# Patient Record
Sex: FEMALE | Race: WHITE | Employment: FULL TIME | URBAN - METROPOLITAN AREA
[De-identification: names, ages, dates, MRNs, and addresses within clinical notes are randomized per-mention and may not be internally consistent; named-entity substitution may affect disease eponyms.]

---

## 2020-06-10 ENCOUNTER — HOSPITAL ENCOUNTER (OUTPATIENT)
Age: 50
Setting detail: SPECIMEN
Discharge: HOME | End: 2020-06-10
Payer: COMMERCIAL

## 2020-06-10 LAB — POTASSIUM SERPL-SCNC: 4.1 MMOL/L (ref 3.7–5.3)

## 2020-10-01 ENCOUNTER — HOSPITAL ENCOUNTER (OUTPATIENT)
Age: 50
Setting detail: SPECIMEN
Discharge: HOME | End: 2020-10-01
Payer: COMMERCIAL

## 2020-10-01 LAB
ALBUMIN SERPL-MCNC: 4.5 G/DL (ref 3.5–5.2)
ALBUMIN/GLOBULIN RATIO: 2 (ref 1–2.5)
ALP BLD-CCNC: 109 U/L (ref 35–104)
ALT SERPL-CCNC: 28 U/L (ref 5–33)
AMYLASE: 58 U/L (ref 28–100)
ANION GAP SERPL CALCULATED.3IONS-SCNC: 10 MMOL/L (ref 9–17)
AST SERPL-CCNC: 21 U/L
BILIRUB SERPL-MCNC: 0.41 MG/DL (ref 0.3–1.2)
BUN BLDV-MCNC: 22 MG/DL (ref 6–20)
BUN/CREAT BLD: ABNORMAL (ref 9–20)
CALCIUM SERPL-MCNC: 9.6 MG/DL (ref 8.6–10.4)
CHLORIDE BLD-SCNC: 103 MMOL/L (ref 98–107)
CO2: 28 MMOL/L (ref 20–31)
CREAT SERPL-MCNC: 0.89 MG/DL (ref 0.5–0.9)
GFR AFRICAN AMERICAN: >60 ML/MIN
GFR NON-AFRICAN AMERICAN: >60 ML/MIN
GFR SERPL CREATININE-BSD FRML MDRD: ABNORMAL ML/MIN/{1.73_M2}
GFR SERPL CREATININE-BSD FRML MDRD: ABNORMAL ML/MIN/{1.73_M2}
GLUCOSE BLD-MCNC: 106 MG/DL (ref 70–99)
HCT VFR BLD CALC: 41.6 % (ref 36.3–47.1)
HEMOGLOBIN: 13.9 G/DL (ref 11.9–15.1)
LIPASE: 24 U/L (ref 13–60)
MCH RBC QN AUTO: 30.8 PG (ref 25.2–33.5)
MCHC RBC AUTO-ENTMCNC: 33.4 G/DL (ref 28.4–34.8)
MCV RBC AUTO: 92 FL (ref 82.6–102.9)
NRBC AUTOMATED: 0 PER 100 WBC
PDW BLD-RTO: 12.3 % (ref 11.8–14.4)
PLATELET # BLD: 267 K/UL (ref 138–453)
PMV BLD AUTO: 10.5 FL (ref 8.1–13.5)
POTASSIUM SERPL-SCNC: 4 MMOL/L (ref 3.7–5.3)
RBC # BLD: 4.52 M/UL (ref 3.95–5.11)
SODIUM BLD-SCNC: 141 MMOL/L (ref 135–144)
TOTAL PROTEIN: 6.7 G/DL (ref 6.4–8.3)
WBC # BLD: 7.4 K/UL (ref 3.5–11.3)

## 2021-07-20 ENCOUNTER — HOSPITAL ENCOUNTER (OUTPATIENT)
Dept: GENERAL RADIOLOGY | Age: 51
Discharge: HOME | End: 2021-07-22
Payer: COMMERCIAL

## 2021-07-20 ENCOUNTER — HOSPITAL ENCOUNTER (OUTPATIENT)
Age: 51
Discharge: HOME | End: 2021-07-22
Payer: COMMERCIAL

## 2021-07-20 DIAGNOSIS — S93.491A HIGH ANKLE SPRAIN OF RIGHT LOWER EXTREMITY, INITIAL ENCOUNTER: ICD-10-CM

## 2021-07-20 PROCEDURE — 73610 X-RAY EXAM OF ANKLE: CPT

## 2023-01-12 ENCOUNTER — APPOINTMENT (OUTPATIENT)
Dept: CT IMAGING | Age: 53
End: 2023-01-12
Payer: COMMERCIAL

## 2023-01-12 ENCOUNTER — APPOINTMENT (OUTPATIENT)
Dept: GENERAL RADIOLOGY | Age: 53
End: 2023-01-12
Payer: COMMERCIAL

## 2023-01-12 ENCOUNTER — HOSPITAL ENCOUNTER (EMERGENCY)
Age: 53
Discharge: HOME | End: 2023-01-12
Attending: EMERGENCY MEDICINE
Payer: COMMERCIAL

## 2023-01-12 VITALS
TEMPERATURE: 98.2 F | BODY MASS INDEX: 28.56 KG/M2 | OXYGEN SATURATION: 100 % | RESPIRATION RATE: 16 BRPM | WEIGHT: 204 LBS | SYSTOLIC BLOOD PRESSURE: 102 MMHG | HEIGHT: 71 IN | HEART RATE: 60 BPM | DIASTOLIC BLOOD PRESSURE: 63 MMHG

## 2023-01-12 DIAGNOSIS — S52.021A CLOSED FRACTURE OF OLECRANON PROCESS OF RIGHT ULNA, INITIAL ENCOUNTER: Primary | ICD-10-CM

## 2023-01-12 DIAGNOSIS — S93.402A SPRAIN OF LEFT ANKLE, UNSPECIFIED LIGAMENT, INITIAL ENCOUNTER: ICD-10-CM

## 2023-01-12 LAB
ABSOLUTE EOS #: 0 K/UL (ref 0–0.4)
ABSOLUTE LYMPH #: 1.4 K/UL (ref 1–4.8)
ABSOLUTE MONO #: 0.5 K/UL (ref 0.1–1.3)
ALBUMIN SERPL-MCNC: 4.1 G/DL (ref 3.5–5.2)
ALP BLD-CCNC: 114 U/L (ref 35–104)
ALT SERPL-CCNC: 27 U/L (ref 5–33)
ANION GAP SERPL CALCULATED.3IONS-SCNC: 13 MMOL/L (ref 9–17)
AST SERPL-CCNC: 21 U/L
BASOPHILS # BLD: 0 % (ref 0–2)
BASOPHILS ABSOLUTE: 0 K/UL (ref 0–0.2)
BILIRUB SERPL-MCNC: 0.5 MG/DL (ref 0.3–1.2)
BUN BLDV-MCNC: 21 MG/DL (ref 6–20)
CALCIUM SERPL-MCNC: 9.7 MG/DL (ref 8.6–10.4)
CHLORIDE BLD-SCNC: 102 MMOL/L (ref 98–107)
CO2: 26 MMOL/L (ref 20–31)
CREAT SERPL-MCNC: 1.01 MG/DL (ref 0.5–0.9)
EOSINOPHILS RELATIVE PERCENT: 1 % (ref 0–4)
GFR SERPL CREATININE-BSD FRML MDRD: >60 ML/MIN/1.73M2
GLUCOSE BLD-MCNC: 123 MG/DL (ref 70–99)
HCG(URINE) PREGNANCY TEST: NEGATIVE
HCT VFR BLD CALC: 40 % (ref 36–46)
HEMOGLOBIN: 13.8 G/DL (ref 12–16)
LYMPHOCYTES # BLD: 15 % (ref 24–44)
MAGNESIUM: 2.1 MG/DL (ref 1.6–2.6)
MCH RBC QN AUTO: 30.9 PG (ref 26–34)
MCHC RBC AUTO-ENTMCNC: 34.4 G/DL (ref 31–37)
MCV RBC AUTO: 90 FL (ref 80–100)
MONOCYTES # BLD: 6 % (ref 1–7)
PDW BLD-RTO: 13.1 % (ref 11.5–14.9)
PLATELET # BLD: 248 K/UL (ref 150–450)
PMV BLD AUTO: 7.7 FL (ref 6–12)
POTASSIUM SERPL-SCNC: 4 MMOL/L (ref 3.7–5.3)
RBC # BLD: 4.45 M/UL (ref 4–5.2)
SEG NEUTROPHILS: 78 % (ref 36–66)
SEGMENTED NEUTROPHILS ABSOLUTE COUNT: 7.2 K/UL (ref 1.3–9.1)
SODIUM BLD-SCNC: 141 MMOL/L (ref 135–144)
TOTAL PROTEIN: 6.7 G/DL (ref 6.4–8.3)
WBC # BLD: 9.1 K/UL (ref 3.5–11)

## 2023-01-12 PROCEDURE — 73080 X-RAY EXAM OF ELBOW: CPT

## 2023-01-12 PROCEDURE — 80053 COMPREHEN METABOLIC PANEL: CPT

## 2023-01-12 PROCEDURE — 83735 ASSAY OF MAGNESIUM: CPT

## 2023-01-12 PROCEDURE — 81025 URINE PREGNANCY TEST: CPT

## 2023-01-12 PROCEDURE — 93005 ELECTROCARDIOGRAM TRACING: CPT | Performed by: EMERGENCY MEDICINE

## 2023-01-12 PROCEDURE — 36415 COLL VENOUS BLD VENIPUNCTURE: CPT

## 2023-01-12 PROCEDURE — 2580000003 HC RX 258: Performed by: EMERGENCY MEDICINE

## 2023-01-12 PROCEDURE — 73610 X-RAY EXAM OF ANKLE: CPT

## 2023-01-12 PROCEDURE — 85025 COMPLETE CBC W/AUTO DIFF WBC: CPT

## 2023-01-12 PROCEDURE — 73200 CT UPPER EXTREMITY W/O DYE: CPT

## 2023-01-12 PROCEDURE — 99285 EMERGENCY DEPT VISIT HI MDM: CPT

## 2023-01-12 RX ORDER — NAPROXEN 500 MG/1
500 TABLET ORAL 2 TIMES DAILY
Qty: 20 TABLET | Refills: 0 | Status: SHIPPED | OUTPATIENT
Start: 2023-01-12

## 2023-01-12 RX ORDER — HYDROCODONE BITARTRATE AND ACETAMINOPHEN 5; 325 MG/1; MG/1
1 TABLET ORAL EVERY 6 HOURS PRN
Qty: 12 TABLET | Refills: 0 | Status: ON HOLD | OUTPATIENT
Start: 2023-01-12 | End: 2023-01-13 | Stop reason: HOSPADM

## 2023-01-12 RX ORDER — 0.9 % SODIUM CHLORIDE 0.9 %
1000 INTRAVENOUS SOLUTION INTRAVENOUS ONCE
Status: COMPLETED | OUTPATIENT
Start: 2023-01-12 | End: 2023-01-12

## 2023-01-12 RX ORDER — ACETAMINOPHEN 500 MG
1000 TABLET ORAL EVERY 6 HOURS PRN
Qty: 60 TABLET | Refills: 0 | Status: SHIPPED | OUTPATIENT
Start: 2023-01-12

## 2023-01-12 RX ADMIN — SODIUM CHLORIDE 1000 ML: 9 INJECTION, SOLUTION INTRAVENOUS at 09:00

## 2023-01-12 RX ADMIN — SODIUM CHLORIDE 1000 ML: 9 INJECTION, SOLUTION INTRAVENOUS at 10:16

## 2023-01-12 ASSESSMENT — PAIN DESCRIPTION - LOCATION: LOCATION: ELBOW

## 2023-01-12 ASSESSMENT — PAIN - FUNCTIONAL ASSESSMENT
PAIN_FUNCTIONAL_ASSESSMENT: 0-10
PAIN_FUNCTIONAL_ASSESSMENT: 0-10

## 2023-01-12 ASSESSMENT — PAIN SCALES - GENERAL: PAINLEVEL_OUTOF10: 4

## 2023-01-12 ASSESSMENT — PAIN DESCRIPTION - PAIN TYPE
TYPE: ACUTE PAIN
TYPE: ACUTE PAIN

## 2023-01-12 ASSESSMENT — PAIN DESCRIPTION - ORIENTATION: ORIENTATION: RIGHT

## 2023-01-12 ASSESSMENT — PAIN DESCRIPTION - FREQUENCY: FREQUENCY: CONTINUOUS

## 2023-01-12 ASSESSMENT — PAIN DESCRIPTION - DESCRIPTORS: DESCRIPTORS: SHARP

## 2023-01-12 NOTE — ED NOTES
E-mailed result note. Left message to call back or check e-mail for information.    Unable to wear sling due to posterior splint position per ortho instructions     Pearl Ledesma RN  01/12/23 0021

## 2023-01-13 ENCOUNTER — HOSPITAL ENCOUNTER (OUTPATIENT)
Age: 53
Setting detail: OUTPATIENT SURGERY
Discharge: HOME | End: 2023-01-13
Attending: STUDENT IN AN ORGANIZED HEALTH CARE EDUCATION/TRAINING PROGRAM | Admitting: STUDENT IN AN ORGANIZED HEALTH CARE EDUCATION/TRAINING PROGRAM
Payer: COMMERCIAL

## 2023-01-13 ENCOUNTER — APPOINTMENT (OUTPATIENT)
Dept: GENERAL RADIOLOGY | Age: 53
End: 2023-01-13
Attending: STUDENT IN AN ORGANIZED HEALTH CARE EDUCATION/TRAINING PROGRAM
Payer: COMMERCIAL

## 2023-01-13 ENCOUNTER — ANESTHESIA (OUTPATIENT)
Dept: OPERATING ROOM | Age: 53
End: 2023-01-13
Payer: COMMERCIAL

## 2023-01-13 ENCOUNTER — ANESTHESIA EVENT (OUTPATIENT)
Dept: OPERATING ROOM | Age: 53
End: 2023-01-13
Payer: COMMERCIAL

## 2023-01-13 VITALS
RESPIRATION RATE: 18 BRPM | TEMPERATURE: 97.2 F | SYSTOLIC BLOOD PRESSURE: 119 MMHG | OXYGEN SATURATION: 95 % | DIASTOLIC BLOOD PRESSURE: 69 MMHG | HEART RATE: 68 BPM

## 2023-01-13 DIAGNOSIS — G89.18 POST-OP PAIN: Primary | ICD-10-CM

## 2023-01-13 PROBLEM — S52.021A CLOSED FRACTURE OF RIGHT OLECRANON PROCESS: Status: ACTIVE | Noted: 2023-01-13

## 2023-01-13 LAB
EKG ATRIAL RATE: 50 BPM
EKG P AXIS: 48 DEGREES
EKG P-R INTERVAL: 164 MS
EKG Q-T INTERVAL: 484 MS
EKG QRS DURATION: 74 MS
EKG QTC CALCULATION (BAZETT): 441 MS
EKG R AXIS: 78 DEGREES
EKG T AXIS: 49 DEGREES
EKG VENTRICULAR RATE: 50 BPM

## 2023-01-13 PROCEDURE — 2720000010 HC SURG SUPPLY STERILE: Performed by: STUDENT IN AN ORGANIZED HEALTH CARE EDUCATION/TRAINING PROGRAM

## 2023-01-13 PROCEDURE — 7100000001 HC PACU RECOVERY - ADDTL 15 MIN: Performed by: STUDENT IN AN ORGANIZED HEALTH CARE EDUCATION/TRAINING PROGRAM

## 2023-01-13 PROCEDURE — 99222 1ST HOSP IP/OBS MODERATE 55: CPT | Performed by: STUDENT IN AN ORGANIZED HEALTH CARE EDUCATION/TRAINING PROGRAM

## 2023-01-13 PROCEDURE — 2580000003 HC RX 258

## 2023-01-13 PROCEDURE — 6360000002 HC RX W HCPCS: Performed by: ANESTHESIOLOGY

## 2023-01-13 PROCEDURE — 7100000011 HC PHASE II RECOVERY - ADDTL 15 MIN: Performed by: STUDENT IN AN ORGANIZED HEALTH CARE EDUCATION/TRAINING PROGRAM

## 2023-01-13 PROCEDURE — 7100000000 HC PACU RECOVERY - FIRST 15 MIN: Performed by: STUDENT IN AN ORGANIZED HEALTH CARE EDUCATION/TRAINING PROGRAM

## 2023-01-13 PROCEDURE — 93010 ELECTROCARDIOGRAM REPORT: CPT | Performed by: INTERNAL MEDICINE

## 2023-01-13 PROCEDURE — 2500000003 HC RX 250 WO HCPCS: Performed by: ANESTHESIOLOGY

## 2023-01-13 PROCEDURE — 3600000004 HC SURGERY LEVEL 4 BASE: Performed by: STUDENT IN AN ORGANIZED HEALTH CARE EDUCATION/TRAINING PROGRAM

## 2023-01-13 PROCEDURE — 6360000002 HC RX W HCPCS: Performed by: STUDENT IN AN ORGANIZED HEALTH CARE EDUCATION/TRAINING PROGRAM

## 2023-01-13 PROCEDURE — 2580000003 HC RX 258: Performed by: STUDENT IN AN ORGANIZED HEALTH CARE EDUCATION/TRAINING PROGRAM

## 2023-01-13 PROCEDURE — 6370000000 HC RX 637 (ALT 250 FOR IP): Performed by: STUDENT IN AN ORGANIZED HEALTH CARE EDUCATION/TRAINING PROGRAM

## 2023-01-13 PROCEDURE — 64415 NJX AA&/STRD BRCH PLXS IMG: CPT | Performed by: ANESTHESIOLOGY

## 2023-01-13 PROCEDURE — 2580000003 HC RX 258: Performed by: ANESTHESIOLOGY

## 2023-01-13 PROCEDURE — 2500000003 HC RX 250 WO HCPCS

## 2023-01-13 PROCEDURE — 73080 X-RAY EXAM OF ELBOW: CPT

## 2023-01-13 PROCEDURE — 3700000001 HC ADD 15 MINUTES (ANESTHESIA): Performed by: STUDENT IN AN ORGANIZED HEALTH CARE EDUCATION/TRAINING PROGRAM

## 2023-01-13 PROCEDURE — 6360000002 HC RX W HCPCS

## 2023-01-13 PROCEDURE — 24685 OPTX ULNAR FX PROX END W/FIX: CPT | Performed by: STUDENT IN AN ORGANIZED HEALTH CARE EDUCATION/TRAINING PROGRAM

## 2023-01-13 PROCEDURE — 3209999900 FLUORO FOR SURGICAL PROCEDURES

## 2023-01-13 PROCEDURE — 2709999900 HC NON-CHARGEABLE SUPPLY: Performed by: STUDENT IN AN ORGANIZED HEALTH CARE EDUCATION/TRAINING PROGRAM

## 2023-01-13 PROCEDURE — 7100000010 HC PHASE II RECOVERY - FIRST 15 MIN: Performed by: STUDENT IN AN ORGANIZED HEALTH CARE EDUCATION/TRAINING PROGRAM

## 2023-01-13 PROCEDURE — C1713 ANCHOR/SCREW BN/BN,TIS/BN: HCPCS | Performed by: STUDENT IN AN ORGANIZED HEALTH CARE EDUCATION/TRAINING PROGRAM

## 2023-01-13 PROCEDURE — 3600000014 HC SURGERY LEVEL 4 ADDTL 15MIN: Performed by: STUDENT IN AN ORGANIZED HEALTH CARE EDUCATION/TRAINING PROGRAM

## 2023-01-13 PROCEDURE — 3700000000 HC ANESTHESIA ATTENDED CARE: Performed by: STUDENT IN AN ORGANIZED HEALTH CARE EDUCATION/TRAINING PROGRAM

## 2023-01-13 DEVICE — SCREW BNE L22MM DIA3.5MM CORT S STL ST NONCANNULATED LOK: Type: IMPLANTABLE DEVICE | Site: ELBOW | Status: FUNCTIONAL

## 2023-01-13 DEVICE — SCREW BNE L28MM DIA3.5MM CORT S STL ST NONCANNULATED LOK: Type: IMPLANTABLE DEVICE | Site: ELBOW | Status: FUNCTIONAL

## 2023-01-13 DEVICE — SCREW BNE L18MM DIA2.7MM ANK S STL ST VAR ANG LOK FULL THRD: Type: IMPLANTABLE DEVICE | Site: ELBOW | Status: FUNCTIONAL

## 2023-01-13 DEVICE — GRAFT BNE CHIP FRZ DRY CANC CORT 1MM 8MM RANG 10CC READIGRFT: Type: IMPLANTABLE DEVICE | Site: ELBOW | Status: FUNCTIONAL

## 2023-01-13 DEVICE — SCREW BNE L40MM DIA2.7MM ANK S STL ST VAR ANG LOK FULL THRD: Type: IMPLANTABLE DEVICE | Site: ELBOW | Status: FUNCTIONAL

## 2023-01-13 DEVICE — SCREW BNE L50MM DIA2.7MM S STL ST VAR ANG LOK FULL THRD T8: Type: IMPLANTABLE DEVICE | Site: ELBOW | Status: FUNCTIONAL

## 2023-01-13 DEVICE — PLATE BNE L73MM 2 H R PROX OLECRANON S STL VAR ANG LOK: Type: IMPLANTABLE DEVICE | Site: ELBOW | Status: FUNCTIONAL

## 2023-01-13 DEVICE — SCREW BNE L20MM DIA2.7MM ANK S STL ST VAR ANG LOK FULL THRD: Type: IMPLANTABLE DEVICE | Site: ELBOW | Status: FUNCTIONAL

## 2023-01-13 RX ORDER — BUPIVACAINE HYDROCHLORIDE 5 MG/ML
INJECTION, SOLUTION EPIDURAL; INTRACAUDAL
Status: COMPLETED | OUTPATIENT
Start: 2023-01-13 | End: 2023-01-13

## 2023-01-13 RX ORDER — PROPOFOL 10 MG/ML
INJECTION, EMULSION INTRAVENOUS PRN
Status: DISCONTINUED | OUTPATIENT
Start: 2023-01-13 | End: 2023-01-13 | Stop reason: SDUPTHER

## 2023-01-13 RX ORDER — TOBRAMYCIN 1.2 G/30ML
INJECTION, POWDER, LYOPHILIZED, FOR SOLUTION INTRAVENOUS PRN
Status: DISCONTINUED | OUTPATIENT
Start: 2023-01-13 | End: 2023-01-13 | Stop reason: ALTCHOICE

## 2023-01-13 RX ORDER — LIDOCAINE HYDROCHLORIDE 10 MG/ML
INJECTION, SOLUTION EPIDURAL; INFILTRATION; INTRACAUDAL; PERINEURAL PRN
Status: DISCONTINUED | OUTPATIENT
Start: 2023-01-13 | End: 2023-01-13 | Stop reason: SDUPTHER

## 2023-01-13 RX ORDER — OXYCODONE HYDROCHLORIDE AND ACETAMINOPHEN 5; 325 MG/1; MG/1
1 TABLET ORAL EVERY 6 HOURS PRN
Qty: 28 TABLET | Refills: 0 | Status: SHIPPED | OUTPATIENT
Start: 2023-01-13 | End: 2023-01-20

## 2023-01-13 RX ORDER — ROCURONIUM BROMIDE 10 MG/ML
INJECTION, SOLUTION INTRAVENOUS PRN
Status: DISCONTINUED | OUTPATIENT
Start: 2023-01-13 | End: 2023-01-13 | Stop reason: SDUPTHER

## 2023-01-13 RX ORDER — FENTANYL CITRATE 50 UG/ML
INJECTION, SOLUTION INTRAMUSCULAR; INTRAVENOUS PRN
Status: DISCONTINUED | OUTPATIENT
Start: 2023-01-13 | End: 2023-01-13 | Stop reason: SDUPTHER

## 2023-01-13 RX ORDER — SODIUM CHLORIDE 9 MG/ML
INJECTION, SOLUTION INTRAVENOUS PRN
Status: DISCONTINUED | OUTPATIENT
Start: 2023-01-13 | End: 2023-01-13 | Stop reason: HOSPADM

## 2023-01-13 RX ORDER — FENTANYL CITRATE 50 UG/ML
100 INJECTION, SOLUTION INTRAMUSCULAR; INTRAVENOUS ONCE
Status: COMPLETED | OUTPATIENT
Start: 2023-01-13 | End: 2023-01-13

## 2023-01-13 RX ORDER — SODIUM CHLORIDE 9 MG/ML
INJECTION, SOLUTION INTRAVENOUS CONTINUOUS PRN
Status: DISCONTINUED | OUTPATIENT
Start: 2023-01-13 | End: 2023-01-13 | Stop reason: SDUPTHER

## 2023-01-13 RX ORDER — MIDAZOLAM HYDROCHLORIDE 2 MG/2ML
2 INJECTION, SOLUTION INTRAMUSCULAR; INTRAVENOUS ONCE
Status: COMPLETED | OUTPATIENT
Start: 2023-01-13 | End: 2023-01-13

## 2023-01-13 RX ORDER — OXYCODONE HYDROCHLORIDE AND ACETAMINOPHEN 5; 325 MG/1; MG/1
1 TABLET ORAL EVERY 6 HOURS PRN
Qty: 28 TABLET | Refills: 0 | Status: SHIPPED | OUTPATIENT
Start: 2023-01-13 | End: 2023-01-13 | Stop reason: SDUPTHER

## 2023-01-13 RX ORDER — BUPIVACAINE HYDROCHLORIDE 5 MG/ML
30 INJECTION, SOLUTION EPIDURAL; INTRACAUDAL ONCE
Status: DISCONTINUED | OUTPATIENT
Start: 2023-01-13 | End: 2023-01-13 | Stop reason: HOSPADM

## 2023-01-13 RX ORDER — DIPHENHYDRAMINE HYDROCHLORIDE 50 MG/ML
INJECTION INTRAMUSCULAR; INTRAVENOUS PRN
Status: DISCONTINUED | OUTPATIENT
Start: 2023-01-13 | End: 2023-01-13 | Stop reason: SDUPTHER

## 2023-01-13 RX ORDER — SODIUM CHLORIDE 0.9 % (FLUSH) 0.9 %
5-40 SYRINGE (ML) INJECTION EVERY 12 HOURS SCHEDULED
Status: DISCONTINUED | OUTPATIENT
Start: 2023-01-13 | End: 2023-01-13 | Stop reason: HOSPADM

## 2023-01-13 RX ORDER — OXYCODONE HYDROCHLORIDE AND ACETAMINOPHEN 5; 325 MG/1; MG/1
1 TABLET ORAL EVERY 4 HOURS PRN
Status: DISCONTINUED | OUTPATIENT
Start: 2023-01-13 | End: 2023-01-13 | Stop reason: HOSPADM

## 2023-01-13 RX ORDER — ONDANSETRON 2 MG/ML
INJECTION INTRAMUSCULAR; INTRAVENOUS PRN
Status: DISCONTINUED | OUTPATIENT
Start: 2023-01-13 | End: 2023-01-13 | Stop reason: SDUPTHER

## 2023-01-13 RX ORDER — PROPOFOL 10 MG/ML
INJECTION, EMULSION INTRAVENOUS CONTINUOUS PRN
Status: DISCONTINUED | OUTPATIENT
Start: 2023-01-13 | End: 2023-01-13 | Stop reason: SDUPTHER

## 2023-01-13 RX ORDER — ONDANSETRON 2 MG/ML
INJECTION INTRAMUSCULAR; INTRAVENOUS
Status: DISCONTINUED
Start: 2023-01-13 | End: 2023-01-13 | Stop reason: HOSPADM

## 2023-01-13 RX ORDER — DEXAMETHASONE SODIUM PHOSPHATE 10 MG/ML
INJECTION INTRAMUSCULAR; INTRAVENOUS PRN
Status: DISCONTINUED | OUTPATIENT
Start: 2023-01-13 | End: 2023-01-13 | Stop reason: SDUPTHER

## 2023-01-13 RX ORDER — FENTANYL CITRATE 50 UG/ML
50 INJECTION, SOLUTION INTRAMUSCULAR; INTRAVENOUS EVERY 5 MIN PRN
Status: COMPLETED | OUTPATIENT
Start: 2023-01-13 | End: 2023-01-13

## 2023-01-13 RX ORDER — APREPITANT 40 MG/1
40 CAPSULE ORAL ONCE
Status: COMPLETED | OUTPATIENT
Start: 2023-01-13 | End: 2023-01-13

## 2023-01-13 RX ORDER — SODIUM CHLORIDE 0.9 % (FLUSH) 0.9 %
5-40 SYRINGE (ML) INJECTION PRN
Status: DISCONTINUED | OUTPATIENT
Start: 2023-01-13 | End: 2023-01-13 | Stop reason: HOSPADM

## 2023-01-13 RX ORDER — MAGNESIUM HYDROXIDE 1200 MG/15ML
LIQUID ORAL CONTINUOUS PRN
Status: COMPLETED | OUTPATIENT
Start: 2023-01-13 | End: 2023-01-13

## 2023-01-13 RX ORDER — EPHEDRINE SULFATE/0.9% NACL/PF 50 MG/5 ML
SYRINGE (ML) INTRAVENOUS PRN
Status: DISCONTINUED | OUTPATIENT
Start: 2023-01-13 | End: 2023-01-13 | Stop reason: SDUPTHER

## 2023-01-13 RX ORDER — SODIUM CHLORIDE, SODIUM LACTATE, POTASSIUM CHLORIDE, CALCIUM CHLORIDE 600; 310; 30; 20 MG/100ML; MG/100ML; MG/100ML; MG/100ML
INJECTION, SOLUTION INTRAVENOUS CONTINUOUS PRN
Status: DISCONTINUED | OUTPATIENT
Start: 2023-01-13 | End: 2023-01-13 | Stop reason: SDUPTHER

## 2023-01-13 RX ORDER — FENTANYL CITRATE 50 UG/ML
25 INJECTION, SOLUTION INTRAMUSCULAR; INTRAVENOUS EVERY 5 MIN PRN
Status: COMPLETED | OUTPATIENT
Start: 2023-01-13 | End: 2023-01-13

## 2023-01-13 RX ORDER — ONDANSETRON 2 MG/ML
4 INJECTION INTRAMUSCULAR; INTRAVENOUS EVERY 6 HOURS PRN
Status: DISCONTINUED | OUTPATIENT
Start: 2023-01-13 | End: 2023-01-13 | Stop reason: HOSPADM

## 2023-01-13 RX ADMIN — ROCURONIUM BROMIDE 10 MG: 50 INJECTION INTRAVENOUS at 10:34

## 2023-01-13 RX ADMIN — Medication 10 MG: at 10:35

## 2023-01-13 RX ADMIN — FENTANYL CITRATE 50 MCG: 50 INJECTION, SOLUTION INTRAMUSCULAR; INTRAVENOUS at 08:30

## 2023-01-13 RX ADMIN — Medication 2000 MG: at 10:40

## 2023-01-13 RX ADMIN — PROPOFOL 150 MG: 10 INJECTION, EMULSION INTRAVENOUS at 09:50

## 2023-01-13 RX ADMIN — OXYCODONE HYDROCHLORIDE AND ACETAMINOPHEN 1 TABLET: 5; 325 TABLET ORAL at 14:41

## 2023-01-13 RX ADMIN — LIDOCAINE HYDROCHLORIDE 50 MG: 10 INJECTION, SOLUTION EPIDURAL; INFILTRATION; INTRACAUDAL; PERINEURAL at 09:50

## 2023-01-13 RX ADMIN — Medication 10 MG: at 10:23

## 2023-01-13 RX ADMIN — SODIUM CHLORIDE, POTASSIUM CHLORIDE, SODIUM LACTATE AND CALCIUM CHLORIDE: 600; 310; 30; 20 INJECTION, SOLUTION INTRAVENOUS at 09:46

## 2023-01-13 RX ADMIN — PROPOFOL 100 MCG/KG/MIN: 10 INJECTION, EMULSION INTRAVENOUS at 09:56

## 2023-01-13 RX ADMIN — Medication 10 MG: at 11:10

## 2023-01-13 RX ADMIN — FENTANYL CITRATE 25 MCG: 50 INJECTION INTRAMUSCULAR; INTRAVENOUS at 14:13

## 2023-01-13 RX ADMIN — FENTANYL CITRATE 50 MCG: 50 INJECTION, SOLUTION INTRAMUSCULAR; INTRAVENOUS at 10:46

## 2023-01-13 RX ADMIN — APREPITANT 40 MG: 40 CAPSULE ORAL at 08:33

## 2023-01-13 RX ADMIN — ONDANSETRON 4 MG: 2 INJECTION INTRAMUSCULAR; INTRAVENOUS at 12:13

## 2023-01-13 RX ADMIN — FENTANYL CITRATE 50 MCG: 50 INJECTION INTRAMUSCULAR; INTRAVENOUS at 13:50

## 2023-01-13 RX ADMIN — MIDAZOLAM 1 MG: 1 INJECTION INTRAMUSCULAR; INTRAVENOUS at 08:38

## 2023-01-13 RX ADMIN — FENTANYL CITRATE 25 MCG: 50 INJECTION INTRAMUSCULAR; INTRAVENOUS at 14:05

## 2023-01-13 RX ADMIN — BUPIVACAINE HYDROCHLORIDE 20 ML: 5 INJECTION, SOLUTION EPIDURAL; INTRACAUDAL; PERINEURAL at 08:38

## 2023-01-13 RX ADMIN — PROMETHAZINE HYDROCHLORIDE 25 MG: 25 INJECTION INTRAMUSCULAR; INTRAVENOUS at 15:26

## 2023-01-13 RX ADMIN — DIPHENHYDRAMINE HYDROCHLORIDE 25 MG: 50 INJECTION, SOLUTION INTRAMUSCULAR; INTRAVENOUS at 09:55

## 2023-01-13 RX ADMIN — FENTANYL CITRATE 50 MCG: 50 INJECTION INTRAMUSCULAR; INTRAVENOUS at 13:32

## 2023-01-13 RX ADMIN — ROCURONIUM BROMIDE 50 MG: 50 INJECTION INTRAVENOUS at 09:50

## 2023-01-13 RX ADMIN — SODIUM CHLORIDE: 9 INJECTION, SOLUTION INTRAVENOUS at 11:01

## 2023-01-13 RX ADMIN — Medication 10 MG: at 10:48

## 2023-01-13 RX ADMIN — ROCURONIUM BROMIDE 10 MG: 50 INJECTION INTRAVENOUS at 11:24

## 2023-01-13 RX ADMIN — SUGAMMADEX 200 MG: 100 INJECTION, SOLUTION INTRAVENOUS at 12:52

## 2023-01-13 RX ADMIN — FENTANYL CITRATE 50 MCG: 50 INJECTION, SOLUTION INTRAMUSCULAR; INTRAVENOUS at 09:50

## 2023-01-13 RX ADMIN — DEXAMETHASONE SODIUM PHOSPHATE 10 MG: 10 INJECTION INTRAMUSCULAR; INTRAVENOUS at 09:55

## 2023-01-13 RX ADMIN — Medication 10 MG: at 10:20

## 2023-01-13 RX ADMIN — PROPOFOL 100 MCG/KG/MIN: 10 INJECTION, EMULSION INTRAVENOUS at 11:21

## 2023-01-13 ASSESSMENT — PAIN SCALES - GENERAL
PAINLEVEL_OUTOF10: 4
PAINLEVEL_OUTOF10: 7
PAINLEVEL_OUTOF10: 5
PAINLEVEL_OUTOF10: 9
PAINLEVEL_OUTOF10: 8
PAINLEVEL_OUTOF10: 8
PAINLEVEL_OUTOF10: 6
PAINLEVEL_OUTOF10: 5
PAINLEVEL_OUTOF10: 9

## 2023-01-13 ASSESSMENT — PAIN DESCRIPTION - ORIENTATION
ORIENTATION: RIGHT

## 2023-01-13 ASSESSMENT — PAIN DESCRIPTION - LOCATION
LOCATION: ELBOW

## 2023-01-13 NOTE — DISCHARGE INSTRUCTIONS
Orthopedic Instructions:  -Weight bearing status: No heavy lifting, pushing, or pulling with right upper extremity  -Keep dressing clean and dry. Maintain splint, do not get wet, do not remove. -If splint were to fall off or become saturated, do not attempt to put back on or dry out. Return to ED immediately for reapplication.  -Always look for signs of compartment syndrome: pain out of proportion to the injury, pain not controlled with pain medication, numbness in digits, changing of color of digits (paleness). If these signs occur return to ED immediately for reassessment.   -Always work on finger motion (to non-injured fingers) while in splint to decrease swelling.  -It is important to ice (20 min on and 1 hour off) and elevate at heart level to decrease pain and swelling.   -Drink plenty of fluids.  -Call the office or come to Emergency Room if signs of infection appear (hot, swollen, red, draining pus, fever)  -Take medications as prescribed.  -Wean off narcotics (percocet/norco) as soon as possible. Do not take tylenol if still taking narcotics. -No alcoholic beverages or driving/operating machinery for 24 hours.  -Follow up with Dr. Carla Mosqueda in his office  on 1/30/23 at 1:15pm. Call [phone] with questions/concerns. No alcoholic beverages, no driving or operating machinery, no making important decisions for 24 hours. You may have a normal diet but should eat lightly day of surgery. Drink plenty of fluids.   Urinate within 8 hours after surgery, if unable to urinate call your doctor     Call your doctor for the following:   Chills   Temperature greater than 101   Pain that is not tolerable despite taking pain medicine as ordered   There is increased swelling, redness or warmth at surgical site   There is increased drainage or bleeding from surgical site   Do not remove surgical dressing unless instructed to do so by your surgeon

## 2023-01-13 NOTE — ANESTHESIA PRE PROCEDURE
Department of Anesthesiology  Preprocedure Note       Name:  Karmen Reina   Age:  50 y.o.  :  1970                                          MRN:  [id number]         Date:  2023      Surgeon: Moises Angel):  Kevin Miramontes DO    Procedure: Procedure(s):  OPEN REDUCTION INTERNAL FIXATION OLECRANON  (SYNTHES PROXIMAL OLECRANON PLATES, LATERAL ON BEAN BAG, FLAT IRVING TABLE, C-ARM)    Medications prior to admission:   Prior to Admission medications    Medication Sig Start Date End Date Taking? Authorizing Provider   metoprolol tartrate (LOPRESSOR) 25 MG tablet Take 12.5 mg by mouth 2 times daily    Historical Provider, MD   acetaminophen (TYLENOL) 500 MG tablet Take 2 tablets by mouth every 6 hours as needed for Pain 23   Ajay Lopez MD   naproxen (NAPROSYN) 500 MG tablet Take 1 tablet by mouth 2 times daily 23   Ajay Lopez MD   HYDROcodone-acetaminophen (NORCO) 5-325 MG per tablet Take 1 tablet by mouth every 6 hours as needed for Pain for up to 3 days.  Max Daily Amount: 4 tablets 1/12/23 1/15/23  Ajay Lopez MD   potassium chloride (KLOR-CON M) 20 MEQ extended release tablet Take 1 tablet by mouth daily 10/3/16   Lula Gamez MD   levothyroxine (SYNTHROID) 100 MCG tablet Take 100 mcg by mouth 16   Historical Provider, MD   magnesium (MAGNESIUM-OXIDE) 250 MG TABS tablet Take 250 mg by mouth daily    Historical Provider, MD   ALPHA LIPOIC ACID PO Take 600 mg by mouth 4 times daily    Historical Provider, MD   Spacer/Aero-Holding Chambers (AEROCHAMBER MV) MISC As directed 16   Lula Gamez MD   ALVESCO 160 MCG/ACT AERS INHALE 1 PUFF TWO TIMES ADAY 8/10/16   Lula Gamez MD   Calcium Carb-Cholecalciferol (CALCIUM-VITAMIN D) 600-400 MG-UNIT TABS Take 1 tablet by mouth daily    Historical Provider, MD   albuterol sulfate  (90 BASE) MCG/ACT inhaler Inhale 2 puffs into the lungs every 6 hours as needed for Wheezing    Historical Provider, MD   hydrOXYzine (ATARAX) 25 MG tablet Take 1 tablet by mouth every 4 hours as needed for Itching  Patient not taking: Reported on 1/12/2023 6/22/16   Ananth Phillips MD   Cholecalciferol (VITAMIN D3) 5000 UNITS TABS Take 1 each by mouth Daily    Historical Provider, MD   Omega-3 Fatty Acids (OMEGA 3 PO) Take 1 capsule by mouth daily    Historical Provider, MD   Probiotic Product (PROBIOTIC DAILY PO) Take 1 capsule by mouth daily  Patient not taking: Reported on 1/12/2023    Historical Provider, MD   Glucosamine-Chondroit-Vit C-Mn (GLUCOSAMINE CHONDR 1500 COMPLX) CAPS Take 1 tablet by mouth daily    Historical Provider, MD   albuterol (PROVENTIL) (5 MG/ML) 0.5% nebulizer solution Take 1 mL by nebulization every 6 hours as needed for Shortness of Breath 6/8/15   Chapin Hernandez MD   Ascorbic Acid (VITAMIN C) 500 MG tablet Take 500 mg by mouth daily. Historical Provider, MD       Current medications:    No current facility-administered medications for this encounter. Allergies:     Allergies   Allergen Reactions    Doxycycline     Augmentin [Amoxicillin-Pot Clavulanate] Nausea Only    Codeine Nausea And Vomiting    Tramadol Hcl Nausea And Vomiting       Problem List:    Patient Active Problem List   Diagnosis Code    Asthma J45.909    Chronic sinusitis J32.9    Acid reflux disease K21.9    Allergic rhinitis due to allergen J30.9    HLD (hyperlipidemia) E78.5       Past Medical History:        Diagnosis Date    Acquired hypothyroidism     Asthma     COVID-19 11/20/2020    developed pneumonia and pericardial effusion    COVID-19 05/31/2022    treated with paxlovid    Gallbladder polyp     GERD (gastroesophageal reflux disease)     HLD (hyperlipidemia) 09/18/2015    Hypertension     Papillary thyroid carcinoma ([facility] [address] 2015    s/p thyroidectomy    Sinusitis     bilateral chronic    SVT (supraventricular tachycardia) ([facility] [address] 05/20/2022    converted with adenosine in [address] [facility], now on betablocker    Vitamin B 12 deficiency  Wellness examination     [address] [facility], last seen 3/2021 , f/u 1 year    Wellness examination     [facility]ocrinology at [facility], last seen 8/3/2022    Wellness examination     PCP, Dr. Vashti Damico,  last seen 5/9/2022    Wellness examination     [facility], last seen 6/9/2022       Past Surgical History:        Procedure Laterality Date    APPENDECTOMY  12/03/2015    [facility]    CHOLECYSTECTOMY  07/01/2013    COLONOSCOPY  2013    ESOPHAGOGASTRODUODENOSCOPY  05/04/2022    with biopsy and polypectomy    HYSTEROSCOPY  07/18/2017    with D&C and  ablation    INGUINAL HERNIA REPAIR      as infant    MENISCECTOMY  09/29/2009    left knee    OVARIAN CYST REMOVAL  2004    diagnostic lap    SINUS SURGERY  2005    Dr. Denise Gunn Right 2016    completion thyroidectomy    THYROIDECTOMY, PARTIAL Left 12/30/2015    [facility] , papillary thyroid carcinoma    TURBINATE RESECTION  04/28/2008    bilateral       Social History:    Social History     Tobacco Use    Smoking status: Never    Smokeless tobacco: Never   Substance Use Topics    Alcohol use: No     Alcohol/week: 0.0 standard drinks                                Counseling given: Not Answered      Vital Signs (Current): There were no vitals filed for this visit.                                            BP Readings from Last 3 Encounters:   01/12/23 102/63   10/03/16 114/76   09/24/16 110/80       NPO Status: Time of last liquid consumption: 1900                        Time of last solid consumption: 1900                        Date of last liquid consumption: 01/12/23                        Date of last solid food consumption: 01/12/23    BMI:   Wt Readings from Last 3 Encounters:   01/12/23 204 lb (92.5 kg)   09/17/16 190 lb (86.2 kg)   08/10/16 190 lb (86.2 kg)     There is no height or weight on file to calculate BMI.    CBC:   Lab Results   Component Value Date/Time    WBC 9.1 01/12/2023 09:42 AM    RBC 4.45 01/12/2023 09:42 AM    HGB 13.8 01/12/2023 09:42 AM    HCT 40.0 01/12/2023 09:42 AM    MCV 90.0 01/12/2023 09:42 AM    RDW 13.1 01/12/2023 09:42 AM     01/12/2023 09:42 AM       CMP:   Lab Results   Component Value Date/Time     01/12/2023 09:42 AM    K 4.0 01/12/2023 09:42 AM     01/12/2023 09:42 AM    CO2 26 01/12/2023 09:42 AM    BUN 21 01/12/2023 09:42 AM    CREATININE 1.01 01/12/2023 09:42 AM    GFRAA >60 10/01/2020 03:35 PM    LABGLOM >60 01/12/2023 09:42 AM    GLUCOSE 123 01/12/2023 09:42 AM    PROT 6.7 01/12/2023 09:42 AM    CALCIUM 9.7 01/12/2023 09:42 AM    BILITOT 0.5 01/12/2023 09:42 AM    ALKPHOS 114 01/12/2023 09:42 AM    AST 21 01/12/2023 09:42 AM    ALT 27 01/12/2023 09:42 AM       POC Tests: No results for input(s): POCGLU, POCNA, POCK, POCCL, POCBUN, POCHEMO, POCHCT in the last 72 hours. Coags: No results found for: PROTIME, INR, APTT    HCG (If Applicable):   Lab Results   Component Value Date    PREGTESTUR NEGATIVE 01/12/2023        ABGs: No results found for: PHART, PO2ART, LFV5ZXD, CVM0XZJ, BEART, D3UEIPGK     Type & Screen (If Applicable):  No results found for: LABABO, LABRH    Drug/Infectious Status (If Applicable):  No results found for: HIV, HEPCAB    COVID-19 Screening (If Applicable): No results found for: COVID19        Anesthesia Evaluation     history of anesthetic complications: PONV. Airway: Mallampati: I     Neck ROM: full     Dental: normal exam         Pulmonary: breath sounds clear to auscultation  (+) asthma:                            Cardiovascular:    (+) hypertension:, dysrhythmias: SVT,         Rhythm: regular  Rate: normal                    Neuro/Psych:               GI/Hepatic/Renal:   (+) GERD:,           Endo/Other:    (+) hypothyroidism::., .                 Abdominal:         (-) obese       Vascular: Other Findings:           Anesthesia Plan      general and regional     ASA 3       Induction: intravenous.       Anesthetic plan and risks discussed with patient. Plan discussed with CRNA.                     Lesleigh Cushing, MD   1/13/2023

## 2023-01-13 NOTE — BRIEF OP NOTE
Brief Postoperative Note      Patient: Kwadwo Hansen  YOB: 1970  MRN: [id number]    Date of Procedure: 1/13/2023    Pre-Op Diagnosis: Right olecranon fracture with impaction    Post-Op Diagnosis: Right olecranon fracture with impaction       Procedure(s):  Open reduction internal fixation right olecranon fracture    Surgeon(s):  Fannie Stone DO    Assistant:  Resident: Irina De Los Santos DO; Eliana Anaya DO    Anesthesia: General    Estimated Blood Loss (mL): 30    Fluids: 1200mL crystalloids    TT: 751 minutes    Complications: None    Specimens:   * No specimens in log *    Implants:  Implant Name Type Inv.  Item Serial No.  Lot No. LRB No. Used Action   GRAFT BNE CHIP FRZ DRY CANC NAVNEET 1MM 8MM RANG 10CC READIGRFT - [id number]  GRAFT BNE CHIP FRZ DRY CANC NAVNEET 1MM 8MM RANG 10CC READIGRFT [id number] [facility] TISSUE BANK- [de-identified] Right 1 Implanted   PLATE BNE [id number] 2 H R PROX OLECRANON S STL VIKI ANG TERESA - [id number]  PLATE BNE [id number] 2 H R PROX OLECRANON S STL VIKI ANG TERESA  DEPUY [employer] [place]-  Right 1 Implanted   SCREW BNE L18MM DIA2.7MM ANK S STL ST VIKI ANG TERESA FULL THRD - [id number]  SCREW BNE L18MM DIA2.7MM ANK S STL ST VIKI ANG TERESA FULL THRD  DEPUY [employer]NTHES [place]  Right 1 Implanted   SCREW BNE L20MM DIA2.7MM ANK S STL ST VIKI ANG TERESA FULL THRD - [id number]  SCREW BNE L20MM DIA2.7MM ANK S STL ST VIKI ANG TERESA FULL THRD  DEPUY [employer] [place]-  Right 1 Implanted   SCREW BNE L40MM DIA2.7MM ANK S STL ST VIKI ANG TERESA FULL THRD - [id number]  SCREW BNE L40MM DIA2.7MM ANK S STL ST VIKI ANG TERESA FULL THRD  DEPU[employer] [place]-  Right 1 Implanted   SCREW BNE L50MM DIA2.7MM S STL ST VIKI ANG TERESA FULL THRD T8 - [id number]  SCREW BNE L50MM DIA2.7MM S STL ST VIKI ANG TERESA FULL THRD T8  DEPUY [employer] [place]-  Right 1 Implanted   SCREW BNE L28MM DIA3.5MM NAVNEET S STL ST NONCANNULATED TERESA - [id number]  SCREW BNE L28MM DIA3.5MM NAVNEET S STL ST NONCANNULATED TERESA  DEPUY SYNTHES [place] Right 1 Implanted   SCREW BNE L22MM DIA3.5MM NAVNEET S STL ST NONCANNULATED TERESA - [id number]  SCREW BNE L22MM DIA3.5MM NAVNEET S STL ST NONCANNULATED TERESA  [place] SYNTHES [place]-WD  Right 1 Implanted         Drains:   Urinary Catheter 01/13/23 2 Way (Active)       Findings: Right olecranon fracture. See op note for details.     Electronically signed by Jose Nelson DO on 1/13/2023 at 3:11 PM

## 2023-01-13 NOTE — H&P
History and Physical    Pt Name: Pete Holt  MRN: [id number]  YOB: 1970  Date of evaluation: 1/13/2023  Primary Care Physician: Scout Ashby MD    SUBJECTIVE:   History of Chief Complaint:    Pete Holt is a 50 y.o. female who is scheduled today for OPEN REDUCTION INTERNAL FIXATION OLECRANON  (SYNTHES PROXIMAL OLECRANON PLATES, LATERAL ON BEAN BAG, FLAT IRVING TABLE, C-ARM) - Right. Patient reports yesterday she was walking a flight of steps when she tripped and fell, causing injury to her right arm and left ankle. Patient reports pain to the right arm today without numbness or tingling but states her right hand feels cold. Patient also denies any head trauma or LOC. Allergies  is allergic to doxycycline, augmentin [amoxicillin-pot clavulanate], codeine, and tramadol hcl. Medications  Prior to Admission medications    Medication Sig Start Date End Date Taking? Authorizing Provider   metoprolol tartrate (LOPRESSOR) 25 MG tablet Take 12.5 mg by mouth 2 times daily    Historical Provider, MD   acetaminophen (TYLENOL) 500 MG tablet Take 2 tablets by mouth every 6 hours as needed for Pain 1/12/23   Katrina Smith MD   naproxen (NAPROSYN) 500 MG tablet Take 1 tablet by mouth 2 times daily 1/12/23   Katrina Smith MD   HYDROcodone-acetaminophen (NORCO) 5-325 MG per tablet Take 1 tablet by mouth every 6 hours as needed for Pain for up to 3 days.  Max Daily Amount: 4 tablets 1/12/23 1/15/23  Katrina Smith MD   potassium chloride (KLOR-CON M) 20 MEQ extended release tablet Take 1 tablet by mouth daily 10/3/16   Scout Ashby MD   levothyroxine (SYNTHROID) 100 MCG tablet Take 100 mcg by mouth 9/12/16   Historical Provider, MD   magnesium (MAGNESIUM-OXIDE) 250 MG TABS tablet Take 250 mg by mouth daily    Historical Provider, MD   ALPHA LIPOIC ACID PO Take 600 mg by mouth 4 times daily    Historical Provider, MD   Spacer/Aero-Holding Chambers (AEROCHAMBER MV) [facility] As directed 9/17/16   Carolina Ramos Renae Epstein MD   ALVESCO 160 MCG/ACT AERS INHALE 1 PUFF TWO TIMES ADAY 8/10/16   Gabino Don MD   Calcium Carb-Cholecalciferol (CALCIUM-VITAMIN D) 600-400 MG-UNIT TABS Take 1 tablet by mouth daily    Historical Provider, MD   albuterol sulfate  (90 BASE) MCG/ACT inhaler Inhale 2 puffs into the lungs every 6 hours as needed for Wheezing    Historical Provider, MD   hydrOXYzine (ATARAX) 25 MG tablet Take 1 tablet by mouth every 4 hours as needed for Itching  Patient not taking: Reported on 1/12/2023 6/22/16   Adriane Bal MD   Cholecalciferol (VITAMIN D3) 5000 UNITS TABS Take 1 each by mouth Daily    Historical Provider, MD   Omega-3 Fatty Acids (OMEGA 3 PO) Take 1 capsule by mouth daily    Historical Provider, MD   Probiotic Product (PROBIOTIC DAILY PO) Take 1 capsule by mouth daily  Patient not taking: Reported on 1/12/2023    Historical Provider, MD   Glucosamine-Chondroit-Vit C-Mn (GLUCOSAMINE CHONDR 1500 COMPLX) CAPS Take 1 tablet by mouth daily    Historical Provider, MD   albuterol (PROVENTIL) (5 MG/ML) 0.5% nebulizer solution Take 1 mL by nebulization every 6 hours as needed for Shortness of Breath 6/8/15   Gabino Don MD   Ascorbic Acid (VITAMIN C) 500 MG tablet Take 500 mg by mouth daily. Historical Provider, MD     Past Medical History    has a past medical history of Acquired hypothyroidism, Asthma, COVID-19, COVID-19, Gallbladder polyp, GERD (gastroesophageal reflux disease), HLD (hyperlipidemia), Hypertension, Hypothyroid, Papillary thyroid carcinoma ([address] Pericardial effusion, Sinusitis, SVT (supraventricular tachycardia) ([address] Vitamin B 12 deficiency, Wellness examination, Wellness examination, Wellness examination, and Wellness examination. Past Surgical History   has a past surgical history that includes Colonoscopy (2013); meniscectomy (09/29/2009); turbinate resection (04/28/2008); ovarian cyst removal (2004); sinus surgery (2005); Cholecystectomy (07/01/2013);  Appendectomy (2015); Thyroidectomy, partial (Left, 2015); Thyroidectomy (Right, 2016); Esophagogastroduodenoscopy (2022); Inguinal hernia repair; hysteroscopy (2017); and Endometrial ablation. Social History   reports that she has never smoked. She has never used smokeless tobacco.   reports no history of alcohol use. reports no history of drug use. Marital Status    Children 1  Occupation teacher   Family History  Family Status   Relation Name Status    Father          colon    Mother  Alive    Sister Latake Alive    Sister Dorothy Hirsch     family history includes Cancer in her father and sister; Diabetes in her father. Review of Systems:  CONSTITUTIONAL:   negative for fevers, chills, fatigue and malaise    EYES:   negative for double vision, blurred vision and photophobia    HEENT:   negative for tinnitus, epistaxis and sore throat     RESPIRATORY:   negative for cough, shortness of breath, wheezing     CARDIOVASCULAR:   negative for chest pain, palpitations, syncope, edema     GASTROINTESTINAL:   negative for nausea, vomiting     GENITOURINARY:   negative for incontinence     MUSCULOSKELETAL:   negative for neck or back pain right arm pain; right hand \"cool. \"    NEUROLOGICAL:   Negative for weakness and tingling  negative for headaches and dizziness     PSYCHIATRIC:   negative for anxiety       OBJECTIVE:   VITALS:  vitals were not taken for this visit. See nursing flowsheet. CONSTITUTIONAL:alert & oriented x 3, no acute distress. Calm and pleasant. SKIN:  Warm and dry, no rashes to exposed areas of skin. HEAD:  Normocephalic, atraumatic. EYES: PERRL. EOMs intact. EARS:  Intact and equal bilaterally. No edema or thickening, without lumps, lesions, or discharge. Hearing grossly WNL. NOSE:  Nares patent. No rhinorrhea. MOUTH/THROAT:  Mucous membranes pink and moist, uvula midline, teeth appear to be intact.   NECK: Supple, no lymphadenopathy. LUNGS: Respirations even and non-labored. Clear to auscultation bilaterally, no wheezes, rales, or rhonchi. CARDIOVASCULAR: Regular rate and rhythm, no murmurs/rubs/gallops. ABDOMEN: soft, non-tender and non-distended, bowel sounds active x 4. EXTREMITIES: Mild edema and ecchymosis left ankle. Right arm in splint and ACE wrapped. Right hand fingers are edematous, pale, slightly cool to touch, able to wiggle all exposed fingers. No varicosities to bilateral lower extremities. NEUROLOGIC: CN II-XII are grossly intact. Gait not assessed. IMPRESSIONS:   Olecranon fracture, right, closed. PLANS:   OPEN REDUCTION INTERNAL FIXATION OLECRANON  (SYNTHES PROXIMAL OLECRANON PLATES, LATERAL ON BEAN BAG, FLAT IRVING TABLE, C-ARM) - Right.     Howell Standing, APRN - CNP   Electronically signed 1/13/2023 at 8:12 AM

## 2023-01-13 NOTE — ANESTHESIA PROCEDURE NOTES
Peripheral Block    Patient location during procedure: pre-op  Reason for block: post-op pain management and at surgeon's request  Start time: 1/13/2023 8:38 AM  End time: 1/13/2023 8:41 AM  Staffing  Performed: anesthesiologist   Anesthesiologist: Benoit Corado MD  Preanesthetic Checklist  Completed: patient identified, IV checked, site marked, risks and benefits discussed, surgical/procedural consents, equipment checked, pre-op evaluation, timeout performed, anesthesia consent given, oxygen available, monitors applied/VS acknowledged, fire risk safety assessment completed and verbalized and blood product R/B/A discussed and consented  Peripheral Block   Patient position: supine  Prep: ChloraPrep  Provider prep: mask and sterile gloves  Patient monitoring: cardiac monitor, continuous pulse ox, frequent blood pressure checks and IV access  Block type: Brachial plexus  Interscalene  Laterality: right  Injection technique: single-shot  Guidance: ultrasound guided  Local infiltration: lidocaine  Infiltration strength: 1 %  Local infiltration: lidocaine  Dose: 3 mL    Needle   Needle gauge: 21 G  Needle localization: ultrasound guidance  Needle length: 10 cm  Assessment   Injection assessment: negative aspiration for heme, no paresthesia on injection and local visualized surrounding nerve on ultrasound  Paresthesia pain: none  Slow fractionated injection: yes  Hemodynamics: stable  Outcomes: patient tolerated procedure well and uncomplicated    Additional Notes          Medications Administered  bupivacaine (PF) 0.5 % - Perineural   20 mL - 1/13/2023 8:38:00 AM

## 2023-01-13 NOTE — BRIEF OP NOTE
Brief Postoperative Note      Patient: Uziel Deleon  YOB: 1970  MRN: [id number]    Date of Procedure: 1/13/2023    Pre-Op Diagnosis: Right olecranon fracture    Post-Op Diagnosis: Right olecranon fracture       Procedure(s):  Open reduction internal fixation right olecranon fracture    Surgeon(s):  Sudhir Landeros DO    Assistant:  Resident: Tania Banerjee DO; Pablo Reynolds DO    Anesthesia: General    Estimated Blood Loss (mL): 30    Fluids: 1200mL crystalloids    TT: ***    Complications: None    Specimens:   * No specimens in log *    Implants:  Implant Name Type Inv.  Item Serial No.  Lot No. LRB No. Used Action   GRAFT BNE CHIP FRZ DRY CANC NAVNEET 1MM 8MM RANG 10CC READIGRFT - [id number]  GRAFT BNE CHIP FRZ DRY CANC NAVNEET 1MM 8MM RANG 10CC READIGRFT [id number] [facility] TISSUE BANK- [de-identified] Right 1 Implanted   PLATE BNE [id number] 2 H R PROX OLECRANON S STL VIKI ANG TERESA - [id number]  PLATE BNE [id number] 2 H R PROX OLECRANON S STL VIKI ANG TERESA  DEPUY [employer] [place]-  Right 1 Implanted   SCREW BNE L18MM DIA2.7MM ANK S STL ST VIKI ANG TERESA FULL THRD - [id number]  SCREW BNE L18MM DIA2.7MM ANK S STL ST VIKI ANG TERESA FULL THRD  DEPUY SYN[employer] [place]-  Right 1 Implanted   SCREW BNE L20MM DIA2.7MM ANK S STL ST VIKI ANG TERESA FULL THRD - [id number]  SCREW BNE L20MM DIA2.7MM ANK S STL ST VIKI ANG TERESA FULL THRD  DEPUY SYNTHES [place]  Right 1 Implanted   SCREW BNE L40MM DIA2.7MM ANK S STL ST VIKI ANG TERESA FULL THRD - [id number]  SCREW BNE L40MM DIA2.7MM ANK S STL ST VIKI ANG TERESA FULL THRD  DEPUY SYNTHES [place]  Right 1 Implanted   SCREW BNE L50MM DIA2.7MM S STL ST VIKI ANG TERESA FULL THRD T8 - [id number]  SCREW BNE L50MM DIA2.7MM S STL ST VIKI ANG TERESA FULL THRD T8  DEPUY SYNTHES [place]  Right 1 Implanted   SCREW BNE L28MM DIA3.5MM NAVNEET S STL ST NONCANNULATED TERESA - [id number]  SCREW BNE L28MM DIA3.5MM NAVNEET S STL ST NONCANNULATED TERESA  DEPUY SYNTHES [place]  Right 1 Implanted   SCREW BNE L22MM DIA3.5MM NAVNEET S STL ST NONCANNULATED TERESA - [id number]  SCREW BNE L22MM DIA3.5MM NAVNEET S STL ST NONCANNULATED TEERSA  DEPUY SYNTHES [place]  Right 1 Implanted         Drains:   Urinary Catheter 01/13/23 2 Way (Active)       Findings: Right olecranon fracture. See op note for details.     Electronically signed by Trey Beaz DO on 1/13/2023 at 12:30 PM - - -

## 2023-01-14 NOTE — OP NOTE
[address]                                OPERATIVE REPORT    PATIENT NAME: Cristina Flores                 :        1970  MED REC NO:   [id number]                             ROOM:  ACCOUNT NO:   [de-identified]                           ADMIT DATE: 2023  PROVIDER:     Rashmi Pinzon    DATE OF PROCEDURE:  2023    PREOPERATIVE DIAGNOSIS:  Right olecranon fracture with impaction. POSTOPERATIVE DIAGNOSIS:  Right olecranon fracture with impaction. PROCEDURE:  Open reduction and internal fixation of right olecranon  fracture. SURGEON:  Rashmi Pinzon DO    ASSISTANTS:  Иван Clemons DO, PGY-2 and Hebert Guerrero DO, PGY-4    ANESTHESIA:  General.    ESTIMATED BLOOD LOSS:  30 mL. FLUIDS:  1200 mL of crystalloid. TOURNIQUET TIME:  111 minutes. COMPLICATIONS:  None. SPECIMENS:  None. IMPLANTS:  Synthes right olecranon plate and 10 mL of cortical  cancellous bone chips. FINDINGS:  Right olecranon fracture with joint impaction. INDICATIONS:  This is a 53-year-old female who initially presented to  [facility] ED for which I was consulted for after sustaining a fall  onto her right arm which she fell on some steps. Imaging performed  demonstrated displaced right olecranon fracture with impaction. CT was  ordered to confirm. Given the loss of extensor mechanism, we discussed  with the patient need for operative intervention to restore extensor  mechanism. The patient was amenable to this. Consent was obtained and  placed in chart. All questions were answered appropriately.   Surgical  risks including but not limited to bleeding; blood clots; infection;  damage to nearby tissues, vessels, and nerves; wound healing  complications; failure of procedure; stiffness; loss of motion; hardware  failure; hardware irritation; patient dissatisfaction; malunion;  nonunion; anesthesia risk; loss of limb and loss of life were all  discussed with the patient. Knowing these risks, the patient wished to  proceed with surgery as indicated. OPERATIVE PROCEDURE:  The patient was taken to the operative suite,  placed under general anesthesia without any complications. 2 gm of  Ancef was given prior to the procedure. The patient was placed in  lateral decubitus position. Axillary roll was placed. All bony  prominences were well padded. At this time, all team members paused to  identify proper patient name, indications, site, and allergies. All  team members were in agreeance. Right upper extremity was prepped and  draped in normal sterile fashion. Using a marking pen, we mapped our  incision to perform a direct posterior approach to the right olecranon. Using gravity exsanguination, HemaClear tourniquet was applied. Skin  was incised, dissection was carried down through the skin and  subcutaneous tissues. We proceeded sharply onto the ulna and proximally  the triceps tendon was visualized and our fracture was clearly  visualized about the olecranon with displacement. The impaction was  visualized on the more distal extent of the fracture and could be seen  on radiographs. Using an osteotome, after thoroughly irrigated the  wound, we brought forth the impaction and backfilled it with cortical  cancellous bone graft. We then reduced our fracture and held in place  using K-wires provisionally after placing a clamp. We then confirmed  both AP and lateral radiographs adequate reduction. Being satisfied, we  sized our Synthes proximal ulna plate after position on the bone  appropriately and after contouring appropriately. We drilled, measured,  and placed bicortical screws distally and locking screws and bicortical  screws proximally. All screws had excellent fixation. Being satisfied,  we thoroughly irrigated all wounds with normal saline.   We then  whipstitched the small rent that was made in the triceps tendon for our  plate to sit on the bone using #5 FiberWire and then to offload the  proximal fragment on the olecranon, we passed this stitch through a hole  in the plate and adequately tied it down appropriately. We then once  again thoroughly irrigated the wound with normal saline, placed  antibiotic powder in the form of vancomycin and tobramycin powder. We  closed the fascial layer using 0 PDS, deep dermal layer using 2-0  Monocryl, and the skin using 2-0 nylon in horizontal mattress fashion. Wounds were then dressed using Xeroform, fluffs, Webril, and a  well-padded posterior long-arm splint was applied. The patient was then  awoken from general anesthesia and transferred to the PACU in stable  condition. I was present for all aspects of procedure. Meticulous  dissection was used and thorough hemostasis was achieved. The patient  will be no activities to right upper extremity until she follows in my  clinic in 2 weeks. A 22 modifier is being added to this case secondary  to the marginal impaction which increased surgical time and complexity  and required bone grafting to adequately stabilize this impaction which  increased operative time greater than 30%.         Wyatt Camejo    D: 01/13/2023 15:27:10       T: 01/13/2023 15:32:03     [email or web address]  Job#: [id number]     Doc#: [id number]    CC:

## 2023-01-14 NOTE — ANESTHESIA POSTPROCEDURE EVALUATION
Department of Anesthesiology  Postprocedure Note    Patient: Anna Marie  MRN: [id number]  YOB: 1970  Date of evaluation: 1/14/2023      Procedure Summary     Date: 01/13/23 Room / Location: [address]    Anesthesia Start: 1964 Anesthesia Stop: 9620    Procedure: OPEN REDUCTION INTERNAL FIXATION PROXIMAL OLECRANON  , ALLOGRAFT CORTICAL CANCELLOUS  SYNTHES , C-ARM) (Right) Diagnosis:       Olecranon fracture, right, closed, initial encounter      (RIGHT OLECRANON FRACTURE)    Surgeons: Ayaz Gibbons DO Responsible Provider: Robert Walton MD    Anesthesia Type: general, regional ASA Status: 3          Anesthesia Type: No value filed.     Tea Phase I: Tea Score: 10    Tea Phase II: Tea Score: 10      Anesthesia Post Evaluation    Patient location during evaluation: PACU  Patient participation: complete - patient participated  Level of consciousness: awake and alert  Pain score: 3  Airway patency: patent  Nausea & Vomiting: no nausea and no vomiting  Complications: no  Cardiovascular status: hemodynamically stable  Respiratory status: acceptable  Hydration status: euvolemic

## 2023-01-16 ENCOUNTER — TELEPHONE (OUTPATIENT)
Dept: ORTHOPEDIC SURGERY | Age: 53
End: 2023-01-16

## 2023-01-16 DIAGNOSIS — R11.0 NAUSEA: Primary | ICD-10-CM

## 2023-01-16 RX ORDER — ONDANSETRON 4 MG/1
4 TABLET, ORALLY DISINTEGRATING ORAL 3 TIMES DAILY PRN
Qty: 21 TABLET | Refills: 0 | Status: SHIPPED | OUTPATIENT
Start: 2023-01-16

## 2023-01-16 NOTE — TELEPHONE ENCOUNTER
Pt stated the percocet is making her nauseous and hot, she is inquiring if there is an alternative med that can be taken or if she should be prescribed an anti nausea to take along side of it. She notes that by hour five between doses her arm begins to feel hot and tingling. She was also inquiring how much she should be up and moving by this point in recovery. Please advise.     DOS-1/13/22 BRENDEN GAO

## 2023-01-30 ENCOUNTER — APPOINTMENT (OUTPATIENT)
Dept: CT IMAGING | Age: 53
End: 2023-01-30
Payer: COMMERCIAL

## 2023-01-30 ENCOUNTER — OFFICE VISIT (OUTPATIENT)
Dept: ORTHOPEDIC SURGERY | Age: 53
End: 2023-01-30

## 2023-01-30 ENCOUNTER — APPOINTMENT (OUTPATIENT)
Dept: GENERAL RADIOLOGY | Age: 53
End: 2023-01-30
Payer: COMMERCIAL

## 2023-01-30 ENCOUNTER — HOSPITAL ENCOUNTER (EMERGENCY)
Age: 53
Discharge: HOME | End: 2023-01-30
Attending: EMERGENCY MEDICINE
Payer: COMMERCIAL

## 2023-01-30 VITALS
DIASTOLIC BLOOD PRESSURE: 71 MMHG | TEMPERATURE: 98 F | WEIGHT: 204 LBS | OXYGEN SATURATION: 100 % | RESPIRATION RATE: 16 BRPM | SYSTOLIC BLOOD PRESSURE: 114 MMHG | HEIGHT: 71 IN | BODY MASS INDEX: 28.56 KG/M2 | HEART RATE: 70 BPM

## 2023-01-30 VITALS — HEIGHT: 71 IN | WEIGHT: 204 LBS | BODY MASS INDEX: 28.56 KG/M2

## 2023-01-30 DIAGNOSIS — S52.021D CLOSED FRACTURE OF OLECRANON PROCESS OF RIGHT ULNA WITH ROUTINE HEALING, SUBSEQUENT ENCOUNTER: Primary | ICD-10-CM

## 2023-01-30 DIAGNOSIS — R55 SYNCOPE AND COLLAPSE: Primary | ICD-10-CM

## 2023-01-30 LAB
ABSOLUTE EOS #: 0.1 K/UL (ref 0–0.44)
ABSOLUTE IMMATURE GRANULOCYTE: 0.03 K/UL (ref 0–0.3)
ABSOLUTE LYMPH #: 2.35 K/UL (ref 1.1–3.7)
ABSOLUTE MONO #: 0.44 K/UL (ref 0.1–1.2)
ANION GAP SERPL CALCULATED.3IONS-SCNC: 12 MMOL/L (ref 9–17)
BASOPHILS # BLD: 0 % (ref 0–2)
BASOPHILS ABSOLUTE: 0.03 K/UL (ref 0–0.2)
BUN BLDV-MCNC: 18 MG/DL (ref 6–20)
CALCIUM SERPL-MCNC: 9.6 MG/DL (ref 8.6–10.4)
CHLORIDE BLD-SCNC: 101 MMOL/L (ref 98–107)
CO2: 24 MMOL/L (ref 20–31)
CREAT SERPL-MCNC: 0.77 MG/DL (ref 0.5–0.9)
D-DIMER QUANTITATIVE: 0.8 MG/L FEU
EOSINOPHILS RELATIVE PERCENT: 1 % (ref 1–4)
GFR SERPL CREATININE-BSD FRML MDRD: >60 ML/MIN/1.73M2
GLUCOSE BLD-MCNC: 91 MG/DL (ref 70–99)
HCT VFR BLD CALC: 42.4 % (ref 36.3–47.1)
HEMOGLOBIN: 14.3 G/DL (ref 11.9–15.1)
IMMATURE GRANULOCYTES: 0 %
LYMPHOCYTES # BLD: 31 % (ref 24–43)
MCH RBC QN AUTO: 31.2 PG (ref 25.2–33.5)
MCHC RBC AUTO-ENTMCNC: 33.7 G/DL (ref 28.4–34.8)
MCV RBC AUTO: 92.4 FL (ref 82.6–102.9)
MONOCYTES # BLD: 6 % (ref 3–12)
NRBC AUTOMATED: 0 PER 100 WBC
PDW BLD-RTO: 11.9 % (ref 11.8–14.4)
PLATELET # BLD: 307 K/UL (ref 138–453)
PMV BLD AUTO: 10.5 FL (ref 8.1–13.5)
POTASSIUM SERPL-SCNC: 4.1 MMOL/L (ref 3.7–5.3)
RBC # BLD: 4.59 M/UL (ref 3.95–5.11)
SEG NEUTROPHILS: 62 % (ref 36–65)
SEGMENTED NEUTROPHILS ABSOLUTE COUNT: 4.71 K/UL (ref 1.5–8.1)
SODIUM BLD-SCNC: 137 MMOL/L (ref 135–144)
TROPONIN, HIGH SENSITIVITY: <6 NG/L (ref 0–14)
TROPONIN, HIGH SENSITIVITY: <6 NG/L (ref 0–14)
WBC # BLD: 7.7 K/UL (ref 3.5–11.3)

## 2023-01-30 PROCEDURE — 99285 EMERGENCY DEPT VISIT HI MDM: CPT

## 2023-01-30 PROCEDURE — 85379 FIBRIN DEGRADATION QUANT: CPT

## 2023-01-30 PROCEDURE — 71045 X-RAY EXAM CHEST 1 VIEW: CPT

## 2023-01-30 PROCEDURE — 6360000004 HC RX CONTRAST MEDICATION: Performed by: STUDENT IN AN ORGANIZED HEALTH CARE EDUCATION/TRAINING PROGRAM

## 2023-01-30 PROCEDURE — 96374 THER/PROPH/DIAG INJ IV PUSH: CPT

## 2023-01-30 PROCEDURE — 96361 HYDRATE IV INFUSION ADD-ON: CPT

## 2023-01-30 PROCEDURE — 93005 ELECTROCARDIOGRAM TRACING: CPT | Performed by: STUDENT IN AN ORGANIZED HEALTH CARE EDUCATION/TRAINING PROGRAM

## 2023-01-30 PROCEDURE — 71260 CT THORAX DX C+: CPT | Performed by: STUDENT IN AN ORGANIZED HEALTH CARE EDUCATION/TRAINING PROGRAM

## 2023-01-30 PROCEDURE — 6360000002 HC RX W HCPCS: Performed by: EMERGENCY MEDICINE

## 2023-01-30 PROCEDURE — 84484 ASSAY OF TROPONIN QUANT: CPT

## 2023-01-30 PROCEDURE — 85025 COMPLETE CBC W/AUTO DIFF WBC: CPT

## 2023-01-30 PROCEDURE — 80048 BASIC METABOLIC PNL TOTAL CA: CPT

## 2023-01-30 PROCEDURE — 99024 POSTOP FOLLOW-UP VISIT: CPT | Performed by: STUDENT IN AN ORGANIZED HEALTH CARE EDUCATION/TRAINING PROGRAM

## 2023-01-30 PROCEDURE — 2580000003 HC RX 258: Performed by: EMERGENCY MEDICINE

## 2023-01-30 RX ORDER — ONDANSETRON 2 MG/ML
4 INJECTION INTRAMUSCULAR; INTRAVENOUS ONCE
Status: COMPLETED | OUTPATIENT
Start: 2023-01-30 | End: 2023-01-30

## 2023-01-30 RX ORDER — 0.9 % SODIUM CHLORIDE 0.9 %
500 INTRAVENOUS SOLUTION INTRAVENOUS ONCE
Status: COMPLETED | OUTPATIENT
Start: 2023-01-30 | End: 2023-01-30

## 2023-01-30 RX ADMIN — SODIUM CHLORIDE 500 ML: 9 INJECTION, SOLUTION INTRAVENOUS at 15:09

## 2023-01-30 RX ADMIN — IOPAMIDOL 75 ML: 755 INJECTION, SOLUTION INTRAVENOUS at 15:35

## 2023-01-30 RX ADMIN — ONDANSETRON 4 MG: 2 INJECTION INTRAMUSCULAR; INTRAVENOUS at 15:09

## 2023-01-30 ASSESSMENT — ENCOUNTER SYMPTOMS
EYE ITCHING: 0
ABDOMINAL PAIN: 0
SHORTNESS OF BREATH: 0
NAUSEA: 0
SORE THROAT: 0
DIARRHEA: 0
ABDOMINAL DISTENTION: 0
EYE PAIN: 0
SINUS PRESSURE: 0
SINUS PAIN: 0
COUGH: 0
CONSTIPATION: 0

## 2023-01-30 ASSESSMENT — PAIN - FUNCTIONAL ASSESSMENT
PAIN_FUNCTIONAL_ASSESSMENT: 0-10
PAIN_FUNCTIONAL_ASSESSMENT: NONE - DENIES PAIN

## 2023-01-30 ASSESSMENT — PAIN DESCRIPTION - LOCATION: LOCATION: ELBOW

## 2023-01-30 ASSESSMENT — PAIN SCALES - GENERAL: PAINLEVEL_OUTOF10: 4

## 2023-01-30 ASSESSMENT — PAIN DESCRIPTION - ORIENTATION: ORIENTATION: RIGHT

## 2023-01-30 NOTE — ED PROVIDER NOTES
[facility] ED     Emergency Department     Faculty Attestation        I performed a history and physical examination of the patient and discussed management with the resident. I reviewed the residents note and agree with the documented findings and plan of care. Any areas of disagreement are noted on the chart. I was personally present for the key portions of any procedures. I have documented in the chart those procedures where I was not present during the key portions. I have reviewed the emergency nurses triage note. I agree with the chief complaint, past medical history, past surgical history, allergies, medications, social and family history as documented unless otherwise noted below. For Physician Assistant/ Nurse Practitioner cases/documentation I have personally evaluated this patient and have completed at least one if not all key elements of the E/M (history, physical exam, and MDM). Additional findings are as noted. Vital Signs: BP: 108/64  Heart Rate: 56  Resp: 16  Temp: 97.8 °F (36.6 °C) SpO2: 97 %  PCP:  Cheyanne Lamar MD    Pertinent Comments:     Patient is a 61-year-old female with history of asthma as well as SVT and hypertension who presents from the orthopedic clinic. Patient had an ulnar fracture that was surgically pared 2 weeks ago and was having a splint/cast taken off in the clinic today. Arm was in a painful position and she felt very uncomfortable with this began sweating and feeling lightheaded and had a brief syncopal event. There was no chest pain or shortness of breath involved and no loss of bowel or bladder function or any seizure activity. Denies any abdominal pain. Initially was mildly hypotensive at 80/40 however this is resolved with some fluids prior to arrival by EMS and is feeling almost back to baseline.       Physical Examination:  Clear to auscultation bilaterally, regular rate and rhythm with no systolic ejection murmur auscultated, and   abdomen soft/nontender/nondistended/normal bowel sounds/no pulsatile masses palpated. There is no calf swelling noted and no TTP, with strong DP pulses palpated bilaterally. Neuro examination:  CN II-XII intact, Bilateral Upper and Lower extremities with 5/5 strength both proximally and distally, and intact sensation to light touch. Downgoing Babinski's Bilaterally. Finger to nose intact with no pronator drift. PERRL at 3 mm bilaterally without nystagmus. DTR's are 2+ bilaterally. Assessment/Plan:   Patient with syncopal episode likely situational however given recent surgery will obtain brief cardiac work-up as well as screening D-dimer. Reevaluate after        EKG Interpretation    Interpreted by emergency department physician    Rhythm: normal sinus   Rate: Slightly bradycardic at 58 bpm  Axis: normal  Conduction: normal  ST Segments: no acute change  T Waves: no acute change  Q Waves: no acute change    Clinical Impression:  nonspecific EKG. Critical Care  None      (Please note that portions of this note were completed with a voice recognition program. Efforts were made to edit the dictations but occasionally words are mis-transcribed.  Whenever words are used in this note in any gender, they shall be construed as though they were used in the gender appropriate to the circumstances; and whenever words are used in this note in the singular or plural form, they shall be construed as though they were used in the form appropriate to the circumstances.)    MD Johanna Castaneda  Attending Emergency Medicine Physician            Ceci Calhoun MD  01/30/23 8535

## 2023-01-30 NOTE — ED PROVIDER NOTES
[facility]  ED  Emergency Department Encounter  Emergency Medicine Resident     Pt Jo Ruvalcaba  MRN: [id number]  Jonogfjoel 1970  Date of evaluation: 1/30/23  PCP:  Cheyanne Lamar MD  Note Started: 2:07 PM EST      CHIEF COMPLAINT       Chief Complaint   Patient presents with    Loss of Consciousness     Patient was over at ortho for splint removal and x rays, during x rays patient states that the position they had her in was \"extremely painful\" and the patient became diaphoretic, pale and passed out. Patient also had syncopal episode for EMS, they state that her pressure was 80s/40s when she passed out       HISTORY OF PRESENT ILLNESS  (Location/Symptom, Timing/Onset, Context/Setting, Quality, Duration, Modifying Factors, Severity.)      Rommel Nguyen is a 50 y.o. female who presents with syncopal episode. Patient states that she broke her elbow 2 weeks ago and underwent surgical fixation. She describes that event as a result of a mechanical fall. States that while she was being positioned for repeat x-rays at her appointment today she was very uncomfortable and in a lot of pain and told the tech she was not sure she could tolerate positioning. This was followed by a syncopal episode for which the patient was brought to the emergency department. Patient denies any associated chest pain, shortness of breath or palpitations. Medical history includes SVT, patient is on 12.5 mg of metoprolol twice daily. She also has a history of papillary thyroid cancer s/p thyroidectomy 7y ago. No personal history of blood clots. She does report a similar episode after she broke her arm while she was waiting for EMS to arrive.      PAST MEDICAL / SURGICAL / SOCIAL / FAMILY HISTORY      has a past medical history of Acquired hypothyroidism, Asthma, COVID-19, COVID-19, Gallbladder polyp, GERD (gastroesophageal reflux disease), HLD (hyperlipidemia), Hypertension, Hypothyroid, Papillary thyroid carcinoma ([facility] [address] Pericardial effusion, Sinusitis, SVT (supraventricular tachycardia) ([facility] [address] Vitamin B 12 deficiency, Wellness examination, Wellness examination, Wellness examination, and Wellness examination. has a past surgical history that includes Colonoscopy (2013); meniscectomy (09/29/2009); turbinate resection (04/28/2008); ovarian cyst removal (2004); sinus surgery (2005); Cholecystectomy (07/01/2013); Appendectomy (12/03/2015); Thyroidectomy, partial (Left, 12/30/2015); Thyroidectomy (Right, 2016); Esophagogastroduodenoscopy (05/04/2022); Inguinal hernia repair; hysteroscopy (07/18/2017); Endometrial ablation; Elbow fracture surgery (Right, 01/13/2023); and Forearm surgery (Right, 1/13/2023). Social History     Socioeconomic History    Marital status:      Spouse name: Not on file    Number of children: Not on file    Years of education: Not on file    Highest education level: Not on file   Occupational History    Not on file   Tobacco Use    Smoking status: Never    Smokeless tobacco: Never   Substance and Sexual Activity    Alcohol use: No     Alcohol/week: 0.0 standard drinks    Drug use: No    Sexual activity: Not on file   Other Topics Concern    Not on file   Social History Narrative    Not on file     Social Determinants of Health     Financial Resource Strain: Not on file   Food Insecurity: Not on file   Transportation Needs: Not on file   Physical Activity: Not on file   Stress: Not on file   Social Connections: Not on file   Intimate Partner Violence: Not on file   Housing Stability: Not on file       Family History   Problem Relation Age of Onset    Cancer Father     Diabetes Father     Cancer Sister        Allergies:  Doxycycline, Augmentin [amoxicillin-pot clavulanate], Codeine, and Tramadol hcl    Home Medications:  Prior to Admission medications    Medication Sig Start Date End Date Taking?  Authorizing Provider   ondansetron (ZOFRAN-ODT) 4 MG disintegrating tablet Take 1 tablet by mouth 3 times daily as needed for Nausea or Vomiting 1/16/23   Robyn Soulier, APRN - CNP   metoprolol tartrate (LOPRESSOR) 25 MG tablet Take 12.5 mg by mouth 2 times daily    Historical Provider, MD   acetaminophen (TYLENOL) 500 MG tablet Take 2 tablets by mouth every 6 hours as needed for Pain 1/12/23   Noemi Stoll MD   naproxen (NAPROSYN) 500 MG tablet Take 1 tablet by mouth 2 times daily 1/12/23   Noemi Stoll MD   potassium chloride (KLOR-CON M) 20 MEQ extended release tablet Take 1 tablet by mouth daily 10/3/16   Maria Del Carmen Linton MD   levothyroxine (SYNTHROID) 100 MCG tablet Take 100 mcg by mouth 9/12/16   Historical Provider, MD   magnesium (MAGNESIUM-OXIDE) 250 MG TABS tablet Take 250 mg by mouth daily    Historical Provider, MD   ALPHA LIPOIC ACID PO Take 600 mg by mouth 4 times daily    Historical Provider, MD   Spacer/Aero-Holding Chambers (AEROCHAMBER MV) [facility] As directed 9/17/16   Maria Del Carmen Linton MD   ALVESCO 160 MCG/ACT AERS INHALE 1 PUFF TWO TIMES ADAY 8/10/16   Maria Del Carmen Linton MD   Calcium Carb-Cholecalciferol (CALCIUM-VITAMIN D) 600-400 MG-UNIT TABS Take 1 tablet by mouth daily    Historical Provider, MD   albuterol sulfate  (90 BASE) MCG/ACT inhaler Inhale 2 puffs into the lungs every 6 hours as needed for Wheezing    Historical Provider, MD   hydrOXYzine (ATARAX) 25 MG tablet Take 1 tablet by mouth every 4 hours as needed for Itching  Patient not taking: Reported on 1/12/2023 6/22/16   Gonzalo Harrell MD   Cholecalciferol (VITAMIN D3) 5000 UNITS TABS Take 1 each by mouth Daily    Historical Provider, MD   Omega-3 Fatty Acids (OMEGA 3 PO) Take 1 capsule by mouth daily    Historical Provider, MD   Probiotic Product (PROBIOTIC DAILY PO) Take 1 capsule by mouth daily  Patient not taking: Reported on 1/12/2023    Historical Provider, MD   Glucosamine-Chondroit-Vit C-Mn (GLUCOSAMINE CHONDR 1500 COMPLX) CAPS Take 1 tablet by mouth daily    Historical Provider, MD   albuterol (PROVENTIL) (5 MG/ML) 0.5% nebulizer solution Take 1 mL by nebulization every 6 hours as needed for Shortness of Breath 6/8/15   Trinidad Mohs, MD   Ascorbic Acid (VITAMIN C) 500 MG tablet Take 500 mg by mouth daily. Historical Provider, MD       REVIEW OF SYSTEMS       Review of Systems   Constitutional:  Negative for activity change, chills and fever. HENT:  Negative for congestion, sinus pressure, sinus pain and sore throat. Eyes:  Negative for pain and itching. Respiratory:  Negative for cough and shortness of breath. Cardiovascular:  Negative for chest pain. Gastrointestinal:  Negative for abdominal distention, abdominal pain, constipation, diarrhea and nausea. Endocrine: Negative for polyuria. Genitourinary:  Negative for dysuria and frequency. Musculoskeletal:  Negative for arthralgias. Skin:  Negative for rash. Neurological:  Positive for syncope. Negative for light-headedness and headaches. PHYSICAL EXAM      INITIAL VITALS:   /71   Pulse 70   Temp 98 °F (36.7 °C) (Oral)   Resp 16   Ht 5' 11\" (1.803 m)   Wt 204 lb (92.5 kg)   SpO2 100%   BMI 28.45 kg/m²     Physical Exam  Vitals reviewed. Constitutional:       General: She is not in acute distress. HENT:      Head: Normocephalic and atraumatic. Ears:      Comments: Hearing grossly normal     Nose: Nose normal.      Mouth/Throat:      Mouth: Mucous membranes are moist.      Pharynx: Oropharynx is clear. Eyes:      General: No scleral icterus. Conjunctiva/sclera: Conjunctivae normal.      Pupils: Pupils are equal, round, and reactive to light. Neck:      Comments: No bruit  Cardiovascular:      Rate and Rhythm: Normal rate and regular rhythm. Pulses: Normal pulses. Pulmonary:      Effort: Pulmonary effort is normal. No respiratory distress. Breath sounds: Normal breath sounds. Abdominal:      General: There is no distension. Tenderness: There is no abdominal tenderness. There is no guarding. Musculoskeletal:      Cervical back: No muscular tenderness. Right lower leg: No edema. Left lower leg: No edema. Skin:     General: Skin is warm and dry. Capillary Refill: Capillary refill takes less than 2 seconds. Comments: Right elbow surgical wound w/stitches, no dehiscence, drainage or associated erythema. Some soft tissue swelling about the elbow. Distal pulses and sensation intact   Neurological:      General: No focal deficit present. Mental Status: She is alert and oriented to person, place, and time. Mental status is at baseline. DDX/DIAGNOSTIC RESULTS / EMERGENCY DEPARTMENT COURSE / MDM     Medical Decision Making  Amount and/or Complexity of Data Reviewed  Labs: ordered. Decision-making details documented in ED Course. Radiology: ordered. ECG/medicine tests: ordered. Risk  Prescription drug management. Virgie Hawkins is a 50 y.o. woman presenting for syncopal episode most likely vasovagal in nature. No associated or preceding symptoms. However she did report possible earlier episode of this 2 weeks ago when she was waiting for EMS after her fall. Will complete cardiac w/u and since this is her second episode will also do D dimer and follow w/CT PE if positive. She did undergo surgery, although not prolonged, and has been relatively less mobile than previously. No abdominal pain. Currently stable pressures and well appearing. Doubt bleeding ruptured ectopic pregnancy. No tamponade physiology. EKG  Bradycardic to 58, sinus, normal axis, normal intervals, no ST elevations or depressions    All EKG's are interpreted by the Emergency Department Physician who either signs or Co-signs this chart in the absence of a cardiologist.    EMERGENCY DEPARTMENT COURSE:      ED Course as of 02/01/23 1247   Mon Jan 30, 2023   1515 D-Dimer, Quant: 0.80 [LR]   1515 WBC: 7.7 [LR]   1515 Hemoglobin Quant: 14.3 [LR]   9385 Patient's PE study is negative, no acute embolism. Patient stable in the room, will reevaluate. [ES]   1710 Patient was reevaluated at bedside.  Comfortable with going home at this time.  Willing to follow-up with her primary care physician.  Orthopedic surgery came to bedside and did remove the sutures and put Steri-Strips on.  Will discharge at this time. [ES]      ED Course User Index  [ES] Zeke Mills MD  [LR] Cassandra Oneill DO   PE read pending at time of signout. Likely dc    PROCEDURES:  none    CONSULTS:  None    CRITICAL CARE:  See attending note    FINAL IMPRESSION      1. Syncope and collapse          DISPOSITION / PLAN     DISPOSITION Decision To Discharge 01/30/2023 05:10:47 PM      PATIENT REFERRED TO:  Steffen Bolaños MD  [address] [address]  [phone]    Schedule an appointment as soon as possible for a visit       [facility] ED  [facility][note 3][address] [address]  [phone]  Go to   As needed    DISCHARGE MEDICATIONS:  Discharge Medication List as of 1/30/2023  5:11 PM          Cassandra Oneill DO  Emergency Medicine Resident    (Please note that portions of thisnote were completed with a voice recognition program.  Efforts were made to edit the dictations but occasionally words are mis-transcribed.)       Cassandra Oneill DO  Resident  02/01/23 1248       Cassandra Oneill DO  Resident  02/01/23 1257

## 2023-01-30 NOTE — PROGRESS NOTES
[facility]  [address] [address]  Dept Phone: [phone]  Dept Fax: [phone]      Orthopaedic Trauma Clinic Follow Up      Subjective:   Date of Surgery: 1/13/2023    Ira Rodrigues is a 50y.o. year old female who presents to the clinic today for routine follow up 17 days post operatively from open reduction internal fixation of her right olecranon fracture. Patient presents today with her . While patient was heading back to her room from x-rays in the clinic, she became light headed and had a syncopal event. EMS was called and promptly arrived to evaluate the patient. Patient was subsequently taken to the ER for further evaluation so the exam was limited due to urgency of her current state. Patient states her pain has been well controlled with the intermittent use of tylenol. Denies any new injuries or falls. Denies any numbness or tingling. States she has been compliant with her current weightbearing restrictions and has maintained her splint with no complications. Patient was then once again seen in the ED with  present. Patient states that when they position her elbow, she had increased pain which then prompted her to have a syncopal event. Denies any numbness or tingling. Overall she is doing very well. Review of Systems  Gen: no fever, chills, malaise  CV: no chest pain or palpitations  Resp: no cough or shortness of breath  GI: no nausea, vomiting, diarrhea, or constipation  Neuro: no seizures, vertigo, or headache  Msk: right elbow swelling and soreness   10 remaining systems reviewed and negative    Objective : There were no vitals filed for this visit. Body mass index is 28.45 kg/m². General: No acute distress, resting comfortably in the clinic  Neuro: alert. oriented  Eyes: Extra-ocular muscles intact  Pulm: Respirations unlabored and regular.   Skin: warm, well perfused  Psych:   Patient has good fund of knowledge and displays understanding of exam, diagnosis, and plan. RUE:  Skin intact. Swelling and ecchymosis about the elbow, appropriate post op. Surgical incision well approximated and healing without evidence of dehiscence, drainage or erythema. Sutures in place. Wound well approximated. Compartments soft. 2+ rad pulse. Median/Radial/Ulnar nerve SILT. Radiology:  Imaging studies from today were independently reviewed and read as listed below. Any relevant images obtained prior to today's visit were also independently interpreted. History: ORIF right olecranon fracture     Comparison: 1/13/2023    Findings: 3 views of the right elbow (AP, oblique, lateral) in a skeletally mature patient showing internal fixation of an olecranon fracture with a plate and multiple screws. There are no acute hardware complications or loss of fixation. No new fractures or dislocations identified. Impression: Stable ORIF right olecranon fracture. Assessment:   50y.o. year old female with ORIF right olecranon fracture; DOS: 1/13/2023. Plan:   Lengthy discussion had with patient and patient's  about current clinical state. Sutures removed. Steri-Strips applied. - Instructed to clean incisions and/or wounds with soap and water daily then apply clean dressings until wounds are healed and dry. If steri strips are in place, leave in place until they naturally fall off. Avoid soaks of any kind (bath, hot tub, pool, lake, etc.).     - Okay for range of motion of the elbow as tolerated. No lifting, pulling or pushing greater than 5 lbs with the right upper extremity. Order for PT provided. - F/u in 4 weeks. Appointment made for patient and instructions printed out with therapy order and brought to patient's bedside in ED. Follow up:Return in about 4 weeks (around 2/27/2023) for x-rays. No orders of the defined types were placed in this encounter.          Orders Placed This Encounter   Procedures    XR ELBOW RIGHT (MIN 3 VIEWS)     Standing Status:   Future     Number of Occurrences:   1     Standing Expiration Date:   1/26/2024    External Referral To Occupational Therapy     Referral Priority:   Routine     Referral Type:   Eval and Treat     Referral Reason:   Specialty Services Required     Requested Specialty:   Occupational Therapy     Number of Visits Requested:   1       Electronically signed by Koki Rosenthal DO on 1/30/2023 at 5:41 PM    This note is created with the assistance of a speech recognition program.  While intending to generate a document that actually reflects the content of the visit, the document can still have some errors including those of syntax and sound a like substitutions which may escape proof reading.   In such instances, actual meaning can be extrapolated by contextual diversion

## 2023-01-30 NOTE — LETTER
[facility]  [address] [facility] [address] [facility]  Phone: [phone]  Fax: [phone]    Juanita Treadwell,         January 30, 2023     Patient: My Davis   YOB: 1970   Date of Visit: 1/30/2023       · Okay for range of motion of the right elbow as tolerated. · No lifting, pulling or pushing greater than 5 lbs with the right upper extremity. · Clean incisions and/or wounds with soap and water daily. If steri strips are in place, leave in place until they naturally fall off. Do not apply any lotions or ointments directly over incision sites. Avoid soaks of any kind (bath, hot tub, pool, lake, etc.).    · Follow up in 4 weeks on

## 2023-01-30 NOTE — ED PROVIDER NOTES
Faculty Sign-Out Attestation  Handoff taken on the following patient from prior Attending Physician: Tristian Lujan    I was available and discussed any additional care issues that arose and coordinated the management plans with the resident(s) caring for the patient during my duty period. Any areas of disagreement with residents documentation of care or procedures are noted on the chart. I was personally present for the key portions of any/all procedures during my duty period. I have documented in the chart those procedures where I was not present during the key portions. 53-year-old female presenting from orthopedics clinic after syncopal event during painful x-ray. Just got cast off after surgery on her ulna 2 weeks ago. Still nauseated, no longer diaphoretic or lightheaded. Cardiac work-up in process, if normal, plan to discharge home with outpatient follow-up. 3:36 PM EST D-dimer 0.80. Plan to obtain CT scan to rule out PE.          Srini Arora MD  Attending Physician        Srini Arora MD  01/30/23 [id number]

## 2023-01-30 NOTE — DISCHARGE INSTRUCTIONS
Call today or tomorrow to follow up with Lula Gamez MD  in 3 days. Get up slowly; dangle your feet over the bed before standing up, do not stand up quickly. Return to the Emergency Department for blacking out, any headache, slurring of speech, loss of strength, excessive nausea or vomiting, any other care or concern.

## 2023-01-30 NOTE — ED NOTES
Patient ambulatory to the bathroom with standby assist.  Denies dizziness with standing.      Sebastian Gallardo RN  30/24/20 [id number]

## 2023-01-31 LAB
EKG ATRIAL RATE: 58 BPM
EKG P AXIS: 62 DEGREES
EKG P-R INTERVAL: 144 MS
EKG Q-T INTERVAL: 436 MS
EKG QRS DURATION: 72 MS
EKG QTC CALCULATION (BAZETT): 428 MS
EKG R AXIS: 74 DEGREES
EKG T AXIS: 54 DEGREES
EKG VENTRICULAR RATE: 58 BPM

## 2023-01-31 PROCEDURE — 93010 ELECTROCARDIOGRAM REPORT: CPT | Performed by: INTERNAL MEDICINE

## 2023-02-07 ENCOUNTER — TELEPHONE (OUTPATIENT)
Dept: ORTHOPEDIC SURGERY | Age: 53
End: 2023-02-07

## 2023-02-07 NOTE — TELEPHONE ENCOUNTER
FAXED LETTER TO FAX NUMBER PROVIDED
Patient stated that she needs some form of return to work date due to she is not getting paid until her employer knows an estimated return date.  Please fax the information [employer] as they will not pay out on the claim until a estimated date it faxed, thank you    Also pt needs a faxed letter to employer that she is out under doctors care- fax to [phone] [phone] attention: Koki Hernandez
[phone]

## 2023-02-07 NOTE — LETTER
[facility]  [address] [facility] [address] [facility]  Phone: [phone]  Fax: [phone]    Emma Shields DO        February 7, 2023     Patient: Adilene Marc   YOB: 1970   Date of Visit: 2/7/2023       To Mr. Paulson: It is my medical opinion that Meme Jasso remain out of work until further advised. She has been out on a medical leave as of 01/12/2023. If you have any questions or concerns, please don't hesitate to call.     Sincerely,        Emma Shields DO

## 2023-02-13 ENCOUNTER — TELEPHONE (OUTPATIENT)
Dept: ORTHOPEDIC SURGERY | Age: 53
End: 2023-02-13

## 2023-02-13 NOTE — TELEPHONE ENCOUNTER
Patient called in requesting she take her steri strips off since her sutures have been removed. Please advise thank you!

## 2023-02-17 ENCOUNTER — TELEPHONE (OUTPATIENT)
Dept: ORTHOPEDIC SURGERY | Age: 53
End: 2023-02-17

## 2023-02-27 ENCOUNTER — OFFICE VISIT (OUTPATIENT)
Dept: ORTHOPEDIC SURGERY | Age: 53
End: 2023-02-27

## 2023-02-27 VITALS — BODY MASS INDEX: 28.45 KG/M2 | WEIGHT: 204 LBS

## 2023-02-27 DIAGNOSIS — S52.021D CLOSED FRACTURE OF OLECRANON PROCESS OF RIGHT ULNA WITH ROUTINE HEALING, SUBSEQUENT ENCOUNTER: Primary | ICD-10-CM

## 2023-02-27 PROCEDURE — 99024 POSTOP FOLLOW-UP VISIT: CPT | Performed by: STUDENT IN AN ORGANIZED HEALTH CARE EDUCATION/TRAINING PROGRAM

## 2023-02-27 NOTE — PROGRESS NOTES
[facility]  [address] [address] [facility]  Dept Phone: [phone]  Dept Fax: [phone]      Orthopaedic Trauma Clinic Follow Up      Subjective:   Date of Surgery: 1/13/2023    Tracey Butcher is a 50y.o. year old female who presents to the clinic today for follow up status post open reduction internal fixation right olecranon. Patient presents to clinic today with her . Patient overall is doing well. Patient does have some tenderness directly over her ulnar nerve at her elbow, as well as significant loss in range of motion. Patient currently attending physical therapy, and states she is pretty strict about doing her home exercises. She is getting a dynamic brace over the next week to utilize as well. Denies any new injuries or falls. Patient does states she can feel her hardware, and is inquiring if this is something that will get better over time. Denies any numbness or tingling. Review of Systems  Gen: no fever, chills, malaise  CV: no chest pain or palpitations  Resp: no cough or shortness of breath  GI: no nausea, vomiting, diarrhea, or constipation  Neuro: no seizures, vertigo, or headache  Msk: Per HPI  10 remaining systems reviewed and negative    Objective : There were no vitals filed for this visit. Body mass index is 28.45 kg/m². General: No acute distress, resting comfortably in the clinic  Neuro: alert. oriented  Eyes: Extra-ocular muscles intact  Pulm: Respirations unlabored and regular. Skin: warm, well perfused  Psych:   Patient has good fund of knowledge and displays understanding of exam, diagnosis, and plan. MSK: Right upper extremity: Mature scar noted to dorsum aspect of right proximal forearm. Arm is warm, well perfused. AIN/PIN/radial/ulnar/median nerve motor intact. Patient expresses normal sensation light touch C5-T1 distribution. Active range of motion right elbow  degrees. No bony block to motion. Supination/pronation equal to contralateral side. Able to palpate plate, and patient does have some sensitivities about her proximal ulna. Positive Tinel at the cubital tunnel. Radiology:  Imaging studies from today were independently reviewed and read as listed below. Any relevant images obtained prior to today's visit were also independently interpreted. History: 63-year-old female status post open reduction internal fixation right olecranon    Comparison: 1/30/2023    Findings: 3 views of the right elbow (AP/lateral/oblique) in a skeletally mature patient showing redemonstration orthopedic hardware in the form of plate and screws to right proximal ulna. No signs of hardware failure or loosening. No change in overall alignment. Interval bony consolidation about fracture line to proximal ulna. Impression: Stable hardware right ulna with interval healing       Assessment:   50y.o. year old female status post open reduction internal fixation right olecranon  Plan:   Lengthy discussion had with patient about current clinical state. Range of motion as tolerated right upper extremity. Discussed the importance of utilizing dynamic elbow brace to try to a gain elbow flexion/extension. 5 pound weight restriction for 2 more weeks. Continue PT/OT. Discussed that if patient unable to fully gain range of motion, possible have to do capsular release. Patient expressed understanding. Discussed that in terms of the sensitivity about her plate, that if symptoms do not resolve, we can discuss removal of hardware at minimum 1 years time. Patient expressed understanding. Ice for pain and swelling. I will plan on seeing patient back in 6 weeks or sooner if any acute issues arise. All questions answered. Patient is amenable to this plan.     Electronically signed by Anum Helms DO on 2/27/2023 at 4:48 PM    This note is created with the assistance of a speech recognition program.  While intending to generate a document that actually reflects the content of the visit, the document can still have some errors including those of syntax and sound a like substitutions which may escape proof reading.   In such instances, actual meaning can be extrapolated by contextual diversion

## 2023-03-01 ENCOUNTER — TELEPHONE (OUTPATIENT)
Dept: ORTHOPEDIC SURGERY | Age: 53
End: 2023-03-01

## 2023-03-02 ENCOUNTER — PATIENT MESSAGE (OUTPATIENT)
Dept: ORTHOPEDIC SURGERY | Age: 53
End: 2023-03-02

## 2023-03-03 NOTE — TELEPHONE ENCOUNTER
Her range of motion is awesome, good work on that! In terms of the swelling, like you said, it will continue to fluctuate and is normal to experience increased swelling with the increase in PT. It sounds like you are doing all of the correct things and it will continue to get better over time especially as your motion improves. Make sure your compression wrap is from the wrist to the mid upper arm, not just isolated over the elbow. While at rest, make sure you are elevating the elbow as much and as often as possible, stack some pillows up under your arm and avoid letting the arm rest down at your side for periods of time. If heat works better for the stiffness, that is totally fine to use heat. Therapy can also include some soft tissue massage which may help with the swelling.

## 2023-08-30 ENCOUNTER — OFFICE VISIT (OUTPATIENT)
Dept: ORTHOPEDIC SURGERY | Age: 53
End: 2023-08-30
Payer: COMMERCIAL

## 2023-08-30 VITALS — BODY MASS INDEX: 28.56 KG/M2 | WEIGHT: 204 LBS | HEIGHT: 71 IN

## 2023-08-30 DIAGNOSIS — S52.021D CLOSED FRACTURE OF OLECRANON PROCESS OF RIGHT ULNA WITH ROUTINE HEALING, SUBSEQUENT ENCOUNTER: Primary | ICD-10-CM

## 2023-08-30 PROCEDURE — 99213 OFFICE O/P EST LOW 20 MIN: CPT | Performed by: STUDENT IN AN ORGANIZED HEALTH CARE EDUCATION/TRAINING PROGRAM

## 2024-03-22 ENCOUNTER — TELEPHONE (OUTPATIENT)
Dept: SURGERY | Age: 54
End: 2024-03-22

## 2024-03-22 NOTE — TELEPHONE ENCOUNTER
[facility] General Surgery  Screening colonoscopy questionnaire  Tanesha Nieto    Pt Name: Mona Mcqueen  MRN: [id number]  YOB: 1970  Primary Care Physician: Steffen Bolaños MD      Have you ever had a colonoscopy? Yes  When was your last colonoscopy? 2019  Have you ever had polyps or abnormal findings on past colonoscopies no    Have you recently had a stool test to check for colon cancer? No  Was it positive?  na    Do you have any family history of colon cancer? Yes  If yes, which family member had colon cancer? dad  Were they diagnosed younger or older than the age of 60? under    Do you have a history of Crohn's disease or Ulcerative Colitis? No    Do you have a history of constipation? Yes    Do you have a history of bloody or black stools? A little with hemorrhoids    Have you ever had surgery done inside your abdomen? Yes  What surgery? Ovarian cyst, gallbladder, appendix    8. Are you taking any blood thinners? No   If yes, what is the name of the blood thinner? na    Scheduling:    Okay to schedule colonoscopy.  Reason for colonoscopy: Family history of colon cancer    Please access [facility] for colo records

## 2024-04-24 ENCOUNTER — TELEPHONE (OUTPATIENT)
Dept: SURGERY | Age: 54
End: 2024-04-24

## 2024-04-24 NOTE — TELEPHONE ENCOUNTER
Pt would like to know if she can take her potassium prescription the day of colonoscopy? This is a daily medication for her. Please advise.

## 2024-04-24 NOTE — TELEPHONE ENCOUNTER
Spoke to patient, surgery scheduled at [place]. Patient confirmed and information my charted to patient(s) 4/24/24.    Surgery date/time: 5/15/24 @ 11:45pm  Arrival time: 10:15am    Screening colonoscopy

## 2024-04-26 NOTE — TELEPHONE ENCOUNTER
Attempted to contact patient again with Dr. Hussein's message that she is able to take her potassium the day of the colonoscopy. She must avoid any blood thinners the day of. No answer. Message left for patient.

## 2024-05-06 ENCOUNTER — TELEPHONE (OUTPATIENT)
Dept: SURGERY | Age: 54
End: 2024-05-06

## 2024-05-06 DIAGNOSIS — Z12.11 ENCOUNTER FOR SCREENING COLONOSCOPY: Primary | ICD-10-CM

## 2024-05-06 RX ORDER — SOD SULF/POT CHLORIDE/MAG SULF 1.479 G
TABLET ORAL
Qty: 24 TABLET | Refills: 0 | Status: SHIPPED | OUTPATIENT
Start: 2024-05-06

## 2024-05-06 NOTE — TELEPHONE ENCOUNTER
Patient scheduled for colonoscopy on 5/15/24 at 11:45am ,  please sign the pended order for prep.

## 2024-05-14 ENCOUNTER — ANESTHESIA EVENT (OUTPATIENT)
Dept: OPERATING ROOM | Age: 54
End: 2024-05-14
Payer: COMMERCIAL

## 2024-05-15 ENCOUNTER — ANESTHESIA (OUTPATIENT)
Dept: OPERATING ROOM | Age: 54
End: 2024-05-15
Payer: COMMERCIAL

## 2024-05-15 ENCOUNTER — HOSPITAL ENCOUNTER (OUTPATIENT)
Age: 54
Setting detail: OUTPATIENT SURGERY
Discharge: HOME | End: 2024-05-15
Attending: COLON & RECTAL SURGERY | Admitting: COLON & RECTAL SURGERY
Payer: COMMERCIAL

## 2024-05-15 VITALS
HEART RATE: 56 BPM | RESPIRATION RATE: 17 BRPM | BODY MASS INDEX: 29.54 KG/M2 | HEIGHT: 71 IN | SYSTOLIC BLOOD PRESSURE: 103 MMHG | OXYGEN SATURATION: 99 % | TEMPERATURE: 97.3 F | DIASTOLIC BLOOD PRESSURE: 75 MMHG | WEIGHT: 211 LBS

## 2024-05-15 PROBLEM — Z80.0 FAMILY HISTORY OF COLON CANCER: Status: ACTIVE | Noted: 2024-05-15

## 2024-05-15 LAB — HCG, PREGNANCY URINE (POC): NEGATIVE

## 2024-05-15 PROCEDURE — 81025 URINE PREGNANCY TEST: CPT

## 2024-05-15 PROCEDURE — 3700000000 HC ANESTHESIA ATTENDED CARE: Performed by: COLON & RECTAL SURGERY

## 2024-05-15 PROCEDURE — 45378 DIAGNOSTIC COLONOSCOPY: CPT | Performed by: COLON & RECTAL SURGERY

## 2024-05-15 PROCEDURE — 2580000003 HC RX 258: Performed by: ANESTHESIOLOGY

## 2024-05-15 PROCEDURE — 7100000011 HC PHASE II RECOVERY - ADDTL 15 MIN: Performed by: COLON & RECTAL SURGERY

## 2024-05-15 PROCEDURE — 6360000002 HC RX W HCPCS

## 2024-05-15 PROCEDURE — 3700000001 HC ADD 15 MINUTES (ANESTHESIA): Performed by: COLON & RECTAL SURGERY

## 2024-05-15 PROCEDURE — 7100000010 HC PHASE II RECOVERY - FIRST 15 MIN: Performed by: COLON & RECTAL SURGERY

## 2024-05-15 PROCEDURE — 3609027000 HC COLONOSCOPY: Performed by: COLON & RECTAL SURGERY

## 2024-05-15 PROCEDURE — 2500000003 HC RX 250 WO HCPCS

## 2024-05-15 PROCEDURE — 2709999900 HC NON-CHARGEABLE SUPPLY: Performed by: COLON & RECTAL SURGERY

## 2024-05-15 RX ORDER — SODIUM CHLORIDE 0.9 % (FLUSH) 0.9 %
5-40 SYRINGE (ML) INJECTION PRN
Status: DISCONTINUED | OUTPATIENT
Start: 2024-05-15 | End: 2024-05-15 | Stop reason: HOSPADM

## 2024-05-15 RX ORDER — OXYCODONE HYDROCHLORIDE 5 MG/1
10 TABLET ORAL PRN
Status: DISCONTINUED | OUTPATIENT
Start: 2024-05-15 | End: 2024-05-15 | Stop reason: HOSPADM

## 2024-05-15 RX ORDER — ONDANSETRON 2 MG/ML
INJECTION INTRAMUSCULAR; INTRAVENOUS PRN
Status: DISCONTINUED | OUTPATIENT
Start: 2024-05-15 | End: 2024-05-15 | Stop reason: SDUPTHER

## 2024-05-15 RX ORDER — PROPOFOL 10 MG/ML
INJECTION, EMULSION INTRAVENOUS PRN
Status: DISCONTINUED | OUTPATIENT
Start: 2024-05-15 | End: 2024-05-15 | Stop reason: SDUPTHER

## 2024-05-15 RX ORDER — SODIUM CHLORIDE 9 MG/ML
INJECTION, SOLUTION INTRAVENOUS PRN
Status: DISCONTINUED | OUTPATIENT
Start: 2024-05-15 | End: 2024-05-15 | Stop reason: HOSPADM

## 2024-05-15 RX ORDER — LIDOCAINE HYDROCHLORIDE 10 MG/ML
INJECTION, SOLUTION INFILTRATION; PERINEURAL PRN
Status: DISCONTINUED | OUTPATIENT
Start: 2024-05-15 | End: 2024-05-15 | Stop reason: SDUPTHER

## 2024-05-15 RX ORDER — LIDOCAINE HYDROCHLORIDE 10 MG/ML
1 INJECTION, SOLUTION EPIDURAL; INFILTRATION; INTRACAUDAL; PERINEURAL
Status: DISCONTINUED | OUTPATIENT
Start: 2024-05-15 | End: 2024-05-15 | Stop reason: HOSPADM

## 2024-05-15 RX ORDER — ONDANSETRON 2 MG/ML
4 INJECTION INTRAMUSCULAR; INTRAVENOUS
Status: DISCONTINUED | OUTPATIENT
Start: 2024-05-15 | End: 2024-05-15 | Stop reason: HOSPADM

## 2024-05-15 RX ORDER — MIDAZOLAM HYDROCHLORIDE 2 MG/2ML
2 INJECTION, SOLUTION INTRAMUSCULAR; INTRAVENOUS
Status: DISCONTINUED | OUTPATIENT
Start: 2024-05-15 | End: 2024-05-15 | Stop reason: HOSPADM

## 2024-05-15 RX ORDER — HYDRALAZINE HYDROCHLORIDE 20 MG/ML
10 INJECTION INTRAMUSCULAR; INTRAVENOUS
Status: DISCONTINUED | OUTPATIENT
Start: 2024-05-15 | End: 2024-05-15 | Stop reason: HOSPADM

## 2024-05-15 RX ORDER — METOCLOPRAMIDE HYDROCHLORIDE 5 MG/ML
10 INJECTION INTRAMUSCULAR; INTRAVENOUS
Status: DISCONTINUED | OUTPATIENT
Start: 2024-05-15 | End: 2024-05-15 | Stop reason: HOSPADM

## 2024-05-15 RX ORDER — SODIUM CHLORIDE 0.9 % (FLUSH) 0.9 %
5-40 SYRINGE (ML) INJECTION EVERY 12 HOURS SCHEDULED
Status: DISCONTINUED | OUTPATIENT
Start: 2024-05-15 | End: 2024-05-15 | Stop reason: HOSPADM

## 2024-05-15 RX ORDER — LABETALOL HYDROCHLORIDE 5 MG/ML
10 INJECTION, SOLUTION INTRAVENOUS
Status: DISCONTINUED | OUTPATIENT
Start: 2024-05-15 | End: 2024-05-15 | Stop reason: HOSPADM

## 2024-05-15 RX ORDER — MORPHINE SULFATE 2 MG/ML
1 INJECTION, SOLUTION INTRAMUSCULAR; INTRAVENOUS EVERY 5 MIN PRN
Status: DISCONTINUED | OUTPATIENT
Start: 2024-05-15 | End: 2024-05-15 | Stop reason: HOSPADM

## 2024-05-15 RX ORDER — NALOXONE HYDROCHLORIDE 0.4 MG/ML
INJECTION, SOLUTION INTRAMUSCULAR; INTRAVENOUS; SUBCUTANEOUS PRN
Status: DISCONTINUED | OUTPATIENT
Start: 2024-05-15 | End: 2024-05-15 | Stop reason: HOSPADM

## 2024-05-15 RX ORDER — MEPERIDINE HYDROCHLORIDE 50 MG/ML
12.5 INJECTION INTRAMUSCULAR; INTRAVENOUS; SUBCUTANEOUS ONCE
Status: DISCONTINUED | OUTPATIENT
Start: 2024-05-15 | End: 2024-05-15 | Stop reason: HOSPADM

## 2024-05-15 RX ORDER — DIPHENHYDRAMINE HYDROCHLORIDE 50 MG/ML
12.5 INJECTION INTRAMUSCULAR; INTRAVENOUS
Status: DISCONTINUED | OUTPATIENT
Start: 2024-05-15 | End: 2024-05-15 | Stop reason: HOSPADM

## 2024-05-15 RX ORDER — OXYCODONE HYDROCHLORIDE 5 MG/1
5 TABLET ORAL PRN
Status: DISCONTINUED | OUTPATIENT
Start: 2024-05-15 | End: 2024-05-15 | Stop reason: HOSPADM

## 2024-05-15 RX ORDER — SODIUM CHLORIDE, SODIUM LACTATE, POTASSIUM CHLORIDE, CALCIUM CHLORIDE 600; 310; 30; 20 MG/100ML; MG/100ML; MG/100ML; MG/100ML
INJECTION, SOLUTION INTRAVENOUS CONTINUOUS
Status: DISCONTINUED | OUTPATIENT
Start: 2024-05-15 | End: 2024-05-15 | Stop reason: HOSPADM

## 2024-05-15 RX ADMIN — LIDOCAINE HYDROCHLORIDE 40 MG: 10 INJECTION, SOLUTION INFILTRATION; PERINEURAL at 10:55

## 2024-05-15 RX ADMIN — PROPOFOL 150 MCG/KG/MIN: 10 INJECTION, EMULSION INTRAVENOUS at 10:56

## 2024-05-15 RX ADMIN — PROPOFOL 70 MG: 10 INJECTION, EMULSION INTRAVENOUS at 10:55

## 2024-05-15 RX ADMIN — ONDANSETRON 4 MG: 2 INJECTION INTRAMUSCULAR; INTRAVENOUS at 10:57

## 2024-05-15 RX ADMIN — SODIUM CHLORIDE, POTASSIUM CHLORIDE, SODIUM LACTATE AND CALCIUM CHLORIDE: 600; 310; 30; 20 INJECTION, SOLUTION INTRAVENOUS at 10:50

## 2024-05-15 RX ADMIN — PROPOFOL 30 MG: 10 INJECTION, EMULSION INTRAVENOUS at 11:00

## 2024-05-15 ASSESSMENT — ENCOUNTER SYMPTOMS
APNEA: 0
STRIDOR: 0
RECTAL PAIN: 0
NAUSEA: 0
ANAL BLEEDING: 0
DIARRHEA: 0
ABDOMINAL PAIN: 0
ABDOMINAL DISTENTION: 0
CONSTIPATION: 0
CHEST TIGHTNESS: 0
VOMITING: 0
COLOR CHANGE: 0
BACK PAIN: 0
COUGH: 0
BLOOD IN STOOL: 0
WHEEZING: 0

## 2024-05-15 ASSESSMENT — PAIN - FUNCTIONAL ASSESSMENT
PAIN_FUNCTIONAL_ASSESSMENT: NONE - DENIES PAIN
PAIN_FUNCTIONAL_ASSESSMENT: 0-10

## 2024-05-15 NOTE — ANESTHESIA PRE PROCEDURE
Department of Anesthesiology  Preprocedure Note       Name:  Mona Mcqueen   Age:  53 y.o.  :  1970                                          MRN:  [id number]         Date:  5/15/2024      Surgeon: Surgeon(s):  Jesenia Hussein MD    Procedure: Procedure(s):  COLORECTAL CANCER SCREENING, NOT HIGH RISK    Medications prior to admission:   Prior to Admission medications    Medication Sig Start Date End Date Taking? Authorizing Provider   Sodium Sulfate-Mag Sulfate-KCl (SUTAB) 6331-765-068 MG TABS Take as directed by office for colonoscopy. 24   Jesenia Hussein MD   Misc. Devices MISC 1 each by Does not apply route once for 1 dose ELBOW FLEXION DYNASPLINT, RIGHT 23  Isrrael Pinzon,    ondansetron (ZOFRAN-ODT) 4 MG disintegrating tablet Take 1 tablet by mouth 3 times daily as needed for Nausea or Vomiting 23   Augustina Squires, SINAN - CNP   metoprolol tartrate (LOPRESSOR) 25 MG tablet Take 12.5 mg by mouth 2 times daily    Monae Lara MD   acetaminophen (TYLENOL) 500 MG tablet Take 2 tablets by mouth every 6 hours as needed for Pain 23   Hayder Espinoza MD   naproxen (NAPROSYN) 500 MG tablet Take 1 tablet by mouth 2 times daily 23   Hayder Espinoza MD   potassium chloride (KLOR-CON M) 20 MEQ extended release tablet Take 1 tablet by mouth daily 10/3/16   Steffen Bolaños MD   levothyroxine (SYNTHROID) 100 MCG tablet Take 100 mcg by mouth 16   Monae Lara MD   magnesium (MAGNESIUM-OXIDE) 250 MG TABS tablet Take 250 mg by mouth daily    Monae Lara MD   ALPHA LIPOIC ACID PO Take 600 mg by mouth 4 times daily    Monae Lara MD   Spacer/Aero-Holding Chambers (AEROCHAMBER MV) MISC As directed 16   Steffen Bolaños MD   ALVESCO 160 MCG/ACT AERS INHALE 1 PUFF TWO TIMES ADAY 8/10/16   Steffen Bolaños MD   Calcium Carb-Cholecalciferol (CALCIUM-VITAMIN D) 600-400 MG-UNIT TABS Take 1 tablet by mouth daily    Provider, MD Monae

## 2024-05-15 NOTE — OP NOTE
Colonoscopy Operative Report    Pre-operative Diagnosis: Family history of colon cancer    Post-operative Diagnosis: Normal colon, internal hemorrhoids.    Procedure: Total diagnostic colonoscopy    Surgeon: ARLENE Hussein  Assistant: RN/ Technician    IV Medications: Monitored anesthesia administered by anesthesiologist    Complications: None    Estimated blood loss: None    Bowel Prep Quality:    [x]  Good    Findings:      [x]  Normal Colon      [x]  Hemorrhoids: Internal at 1st degree     Specimens:   * No specimens in log *    Implants:  * No implants in log *      Drains:  * No LDAs found *    Details: The patient was placed in left lateral position on the operating table. After the initiation of intravenous sedatives by the anesthesiologist, digital rectal exam was done, and a flexible pediatric colonoscope of variable stiffness was inserted per rectum and advanced all the way to cecum under direct vision.    The cecum was recognized by the ileocecal valve and appendiceal orifice.    The colonoscope was withdrawn, and the ascending, transverse, descending, and sigmoid segments of the colon as well as the rectum were carefully examined.   The tip of the colonoscope was retroflexed in the rectum to inspect the anorectal segment.   Findings listed above were noted, and actions listed above undertaken,    Clinical Impression: Normal colon, small internal hemorrhoids.    Clinical Recommendation: Follow-up colonoscopy in 5 years due to family history of colon cancer in father.    Electronically signed by Jesenia Hussein MD on 5/15/2024 at 11:20 AM

## 2024-05-15 NOTE — ANESTHESIA POSTPROCEDURE EVALUATION
Department of Anesthesiology  Postprocedure Note    Patient: Mona Mcqueen  MRN: [id number]  YOB: 1970  Date of evaluation: 5/15/2024    Procedure Summary       Date: 05/15/24 Room / Location: [facility] PROCEDURE ROOM / [facility]    Anesthesia Start: 1053 Anesthesia Stop: 1124    Procedure: COLORECTAL CANCER SCREENING, NOT HIGH RISK Diagnosis:       Screening for colon cancer      (Screening for colon cancer [Z12.11])    Surgeons: Jesenia Hussein MD Responsible Provider: Keyonna Lucas MD    Anesthesia Type: MAC ASA Status: 2            Anesthesia Type: No value filed.    Tea Phase I: Tea Score: 10    Tea Phase II: Tea Score: 9    Anesthesia Post Evaluation    Patient location during evaluation: PACU  Patient participation: complete - patient participated  Level of consciousness: awake and alert  Airway patency: patent  Nausea & Vomiting: no nausea and no vomiting  Cardiovascular status: blood pressure returned to baseline  Respiratory status: acceptable and room air  Hydration status: euvolemic  Pain management: adequate and satisfactory to patient    No notable events documented.

## 2024-05-15 NOTE — DISCHARGE INSTRUCTIONS
Colonoscopy / Sigmoidoscopy Discharge Instructions  Jesenia Hussein M.D.    You may have a regular diet unless instructed otherwise and if you do not have any abdominal (belly) pains, nausea, vomiting or fever.    No driving, operating machinery or making important decisions for the next 24 hours.    It is normal to feel distended or full in the abdomen; passing gas will help relieve the fullness.  The fullness is from the air put inside your bowel (colon) during the procedure.    It is normal to pass small amounts of blood after the colonoscopy.    Findings today included:  Hemorrhoids Internal at 1st degree  Normal Colon    Medications:   No changes to home medication therapy.     Next colonoscopy recommended in 5 years due to family history of colon cancer unless new symptoms develop in the interim.    Schedule a follow up visit as needed.    Below are abnormal symptoms which will need medical attention; please call the office at any time if these occur.  Large amount of bleeding through the rectum   Severe abdominal pain or pain in the belly (it is normal to have occasional mild discomfort).  Fever of 101F or above, chills or excessive sweating.  Persistent nausea or vomiting.

## 2024-05-15 NOTE — H&P
[facility] [place]  [address]  [facility] [address]  Dept: [phone]  Loc: [phone]    Patient:  Mona Mcqueen  YOB: 1970  Date: 5/15/2024     The patient is a 53 y.o. female who presents today for consult of the following problems:     Chief Complaint: Colonoscopy needed for surveillance for family history of colon cancer.         HPI:   This pleasant 53-year-old  lady presents today with a need for colonoscopy due to family history of colon cancer.  The last colonoscopy was done in 2019 which was reportedly normal.  Patient reports no GI related symptoms at this time.      Past Medical History:   Diagnosis Date    Acquired hypothyroidism     Asthma     COVID-19 11/20/2020    developed pneumonia and pericardial effusion    COVID-19 05/31/2022    treated with paxlovid    Gallbladder polyp     GERD (gastroesophageal reflux disease)     HLD (hyperlipidemia) 09/18/2015    Hypertension     Hypothyroid     s/p thyroidectomy    Papillary thyroid carcinoma (HCC) 2015    s/p thyroidectomy    Pericardial effusion     Sinusitis     bilateral chronic    SVT (supraventricular tachycardia) (HCC) 05/20/2022    converted with adenosine in [facility], now on betablocker    Vitamin B 12 deficiency     Wellness examination     [facility], last seen 3/2021 , f/u 1 year    Wellness examination     Endocrinology at [facility], last seen 8/3/2022    Wellness examination     PCP, Dr. Bolaños , [facility],  last seen 5/9/2022    Wellness examination     [facility], last seen 6/9/2022     Past Surgical History:   Procedure Laterality Date    APPENDECTOMY  12/03/2015    [facility]    CHOLECYSTECTOMY  07/01/2013    COLONOSCOPY  2013    ELBOW FRACTURE SURGERY Right 01/13/2023    OPEN REDUCTION INTERNAL FIXATION PROXIMAL OLECRANON  , ALLOGRAFT CORTICAL CANCELLOUS  SYNTHES    ENDOMETRIAL ABLATION      ESOPHAGOGASTRODUODENOSCOPY  05/04/2022    with  biopsy and polypectomy    FOREARM SURGERY Right 1/13/2023    OPEN REDUCTION INTERNAL FIXATION PROXIMAL OLECRANON  , ALLOGRAFT CORTICAL CANCELLOUS  SYNTHES , C-ARM) performed by Isrrael Pinzon DO at [facility] OR    HYSTEROSCOPY  07/18/2017    with D&C and  ablation    INGUINAL HERNIA REPAIR      as infant    MENISCECTOMY  09/29/2009    left knee    OVARIAN CYST REMOVAL  2004    diagnostic lap    SINUS SURGERY  2005    Dr. Echeverria    THYROIDECTOMY Right 2016    completion thyroidectomy    THYROIDECTOMY, PARTIAL Left 12/30/2015    [facility] , papillary thyroid carcinoma    TURBINATE RESECTION  04/28/2008    bilateral     Family History   Problem Relation Age of Onset    Cancer Father     Diabetes Father     Cancer Sister      Social History     Tobacco Use    Smoking status: Never    Smokeless tobacco: Never   Substance Use Topics    Alcohol use: No     Alcohol/week: 0.0 standard drinks of alcohol    Drug use: No     Current Facility-Administered Medications   Medication Dose Route Frequency Provider Last Rate Last Admin    lidocaine PF 1 % injection 1 mL  1 mL IntraDERmal Once PRN Cristopher Perez MD        lactated ringers IV soln infusion   IntraVENous Continuous Cristopher Perez MD        sodium chloride flush 0.9 % injection 5-40 mL  5-40 mL IntraVENous 2 times per day Cristopher Perez MD        sodium chloride flush 0.9 % injection 5-40 mL  5-40 mL IntraVENous PRN Cristopher Perez MD        0.9 % sodium chloride infusion   IntraVENous PRN Cristopher Perez MD          Allergies   Allergen Reactions    Doxycycline     Augmentin [Amoxicillin-Pot Clavulanate] Nausea Only    Codeine Nausea And Vomiting    Tramadol Hcl Nausea And Vomiting       Review of Systems   Constitutional:  Negative for activity change, appetite change, chills, diaphoresis, fatigue, fever and unexpected weight change.   Respiratory:  Negative for apnea, cough, chest tightness, shortness of breath, wheezing and stridor.

## 2025-04-05 ENCOUNTER — APPOINTMENT (OUTPATIENT)
Dept: GENERAL RADIOLOGY | Age: 55
End: 2025-04-05
Payer: COMMERCIAL

## 2025-04-05 ENCOUNTER — HOSPITAL ENCOUNTER (EMERGENCY)
Age: 55
Discharge: HOME | End: 2025-04-06
Attending: EMERGENCY MEDICINE
Payer: COMMERCIAL

## 2025-04-05 VITALS
BODY MASS INDEX: 29.4 KG/M2 | OXYGEN SATURATION: 99 % | HEART RATE: 85 BPM | DIASTOLIC BLOOD PRESSURE: 93 MMHG | SYSTOLIC BLOOD PRESSURE: 130 MMHG | WEIGHT: 210 LBS | HEIGHT: 71 IN

## 2025-04-05 DIAGNOSIS — R79.89 ELEVATED TSH: ICD-10-CM

## 2025-04-05 DIAGNOSIS — E87.6 HYPOKALEMIA: ICD-10-CM

## 2025-04-05 DIAGNOSIS — R07.89 CHEST WALL PAIN: Primary | ICD-10-CM

## 2025-04-05 LAB
ANION GAP SERPL CALCULATED.3IONS-SCNC: 15 MMOL/L (ref 9–17)
BASOPHILS # BLD: 0 K/UL (ref 0–0.2)
BASOPHILS NFR BLD: 1 % (ref 0–2)
BUN SERPL-MCNC: 18 MG/DL (ref 6–20)
CALCIUM SERPL-MCNC: 9.3 MG/DL (ref 8.6–10.4)
CHLORIDE SERPL-SCNC: 102 MMOL/L (ref 98–107)
CO2 SERPL-SCNC: 23 MMOL/L (ref 20–31)
CREAT SERPL-MCNC: 0.8 MG/DL (ref 0.5–0.9)
D DIMER PPP FEU-MCNC: <0.27 UG/ML FEU (ref 0–0.59)
EOSINOPHIL # BLD: 0.2 K/UL (ref 0–0.4)
EOSINOPHILS RELATIVE PERCENT: 3 % (ref 1–4)
ERYTHROCYTE [DISTWIDTH] IN BLOOD BY AUTOMATED COUNT: 13.6 % (ref 12.5–15.4)
GFR, ESTIMATED: 89 ML/MIN/1.73M2
GLUCOSE SERPL-MCNC: 133 MG/DL (ref 70–99)
HCT VFR BLD AUTO: 43.5 % (ref 36–46)
HGB BLD-MCNC: 14.7 G/DL (ref 12–16)
LYMPHOCYTES NFR BLD: 2.7 K/UL (ref 1–4.8)
LYMPHOCYTES RELATIVE PERCENT: 36 % (ref 24–44)
MAGNESIUM SERPL-MCNC: 2 MG/DL (ref 1.6–2.6)
MCH RBC QN AUTO: 30.8 PG (ref 26–34)
MCHC RBC AUTO-ENTMCNC: 33.7 G/DL (ref 31–37)
MCV RBC AUTO: 91.3 FL (ref 80–100)
MONOCYTES NFR BLD: 0.5 K/UL (ref 0.1–1.2)
MONOCYTES NFR BLD: 7 % (ref 2–11)
NEUTROPHILS NFR BLD: 53 % (ref 36–66)
NEUTS SEG NFR BLD: 4.1 K/UL (ref 1.8–7.7)
PLATELET # BLD AUTO: 257 K/UL (ref 140–450)
PMV BLD AUTO: 7.9 FL (ref 6–12)
POTASSIUM SERPL-SCNC: 3.3 MMOL/L (ref 3.7–5.3)
RBC # BLD AUTO: 4.77 M/UL (ref 4–5.2)
SODIUM SERPL-SCNC: 140 MMOL/L (ref 135–144)
TROPONIN I SERPL HS-MCNC: 11 NG/L (ref 0–14)
TROPONIN I SERPL HS-MCNC: <6 NG/L (ref 0–14)
TSH SERPL DL<=0.05 MIU/L-ACNC: 12.32 UIU/ML (ref 0.3–5)
WBC OTHER # BLD: 7.6 K/UL (ref 3.5–11)

## 2025-04-05 PROCEDURE — 85025 COMPLETE CBC W/AUTO DIFF WBC: CPT

## 2025-04-05 PROCEDURE — 84484 ASSAY OF TROPONIN QUANT: CPT

## 2025-04-05 PROCEDURE — 6370000000 HC RX 637 (ALT 250 FOR IP): Performed by: SPECIALIST

## 2025-04-05 PROCEDURE — 93005 ELECTROCARDIOGRAM TRACING: CPT | Performed by: EMERGENCY MEDICINE

## 2025-04-05 PROCEDURE — 80048 BASIC METABOLIC PNL TOTAL CA: CPT

## 2025-04-05 PROCEDURE — 99285 EMERGENCY DEPT VISIT HI MDM: CPT

## 2025-04-05 PROCEDURE — 36415 COLL VENOUS BLD VENIPUNCTURE: CPT

## 2025-04-05 PROCEDURE — 84439 ASSAY OF FREE THYROXINE: CPT

## 2025-04-05 PROCEDURE — 71045 X-RAY EXAM CHEST 1 VIEW: CPT

## 2025-04-05 PROCEDURE — 85379 FIBRIN DEGRADATION QUANT: CPT

## 2025-04-05 PROCEDURE — 83735 ASSAY OF MAGNESIUM: CPT

## 2025-04-05 PROCEDURE — 84443 ASSAY THYROID STIM HORMONE: CPT

## 2025-04-05 RX ORDER — POTASSIUM CHLORIDE 1500 MG/1
40 TABLET, EXTENDED RELEASE ORAL ONCE
Status: COMPLETED | OUTPATIENT
Start: 2025-04-05 | End: 2025-04-05

## 2025-04-05 RX ADMIN — POTASSIUM CHLORIDE 40 MEQ: 1500 TABLET, EXTENDED RELEASE ORAL at 22:58

## 2025-04-05 ASSESSMENT — PAIN - FUNCTIONAL ASSESSMENT: PAIN_FUNCTIONAL_ASSESSMENT: NONE - DENIES PAIN

## 2025-04-06 LAB
EKG ATRIAL RATE: 75 BPM
EKG P AXIS: 58 DEGREES
EKG P-R INTERVAL: 146 MS
EKG Q-T INTERVAL: 400 MS
EKG QRS DURATION: 82 MS
EKG QTC CALCULATION (BAZETT): 446 MS
EKG R AXIS: 70 DEGREES
EKG T AXIS: 43 DEGREES
EKG VENTRICULAR RATE: 75 BPM
T4 FREE SERPL-MCNC: 1.1 NG/DL (ref 0.92–1.68)

## 2025-04-06 PROCEDURE — 93010 ELECTROCARDIOGRAM REPORT: CPT | Performed by: INTERNAL MEDICINE

## 2025-04-06 NOTE — ED PROVIDER NOTES
Emergency Department     Faculty Attestation    I performed a history and physical examination of the patient and discussed management with the mid level provideer. I reviewed the mid level provider's note and agree with the documented findings and plan of care. Any areas of disagreement are noted on the chart. I was personally present for the key portions of any procedures. I have documented in the chart those procedures where I was not present during the key portions. I have reviewed the emergency nurses triage note. I agree with the chief complaint, past medical history, past surgical history, allergies, medications, social and family history as documented unless otherwise noted below. Documentation of the HPI, Physical Exam and Medical Decision Making performed by medical students or scribes is based on my personal performance of the HPI, PE and MDM. For Physician Assistant/ Nurse Practitioner cases/documentation I have personally evaluated this patient and have completed at least one if not all key elements of the E/M (history, physical exam, and MDM). Additional findings are as noted.      Primary Care Physician:  Steffen Bolaños MD    CHIEF COMPLAINT       Chief Complaint   Patient presents with    Shortness of Breath    Chest Pain     Patient brought in [facility] Patient states feeling flattering in her chest 10am this morning and this afternoon. EMS gave 324mg ASA no nitro d/t no IV access        RECENT VITALS:    ,  Pulse: 85,  , BP: (!) 130/93    LABS:  Labs Reviewed   BASIC METABOLIC PANEL - Abnormal; Notable for the following components:       Result Value    Potassium 3.3 (*)     Glucose 133 (*)     All other components within normal limits   TSH - Abnormal; Notable for the following components:    TSH 12.32 (*)     All other components within normal limits   CBC WITH AUTO DIFFERENTIAL   TROPONIN   D-DIMER, QUANTITATIVE   MAGNESIUM   TROPONIN   T4, FREE         PERTINENT ATTENDING PHYSICIAN

## 2025-04-06 NOTE — ED PROVIDER NOTES
[facility]  [address]  [facility] [address]  Phone: [phone]  Fax: [phone]      Patient Name:  Mona Mcqueen  Medical Record Number:  [id number]  YOB: 1970  Date of Service:  4/5/2025  Primary Care Physician:  Steffen Bolaños MD      CHIEF COMPLAINT:       Chief Complaint   Patient presents with    Shortness of Breath    Chest Pain     Patient brought in [facility] Patient states feeling flattering in her chest 10am this morning and this afternoon. EMS gave 324mg ASA no nitro d/t no IV access        HISTORY OF PRESENT ILLNESS:    Mona Mcqueen is a 54 y.o. female who presents with the complaint of chest pain over the left lateral anterior chest wall.  Nonradiating.  Occurred while sitting at home.  [place] like she had some heart racing as well.  Some mild shortness of breath associated with it.  No other complaints at this time.  Denies any nausea or vomiting.  Denies any constipation or diarrhea.  Denies any lower urinary tract symptoms.    CURRENT MEDICATIONS:      Previous Medications    ACETAMINOPHEN (TYLENOL) 500 MG TABLET    Take 2 tablets by mouth every 6 hours as needed for Pain    ALBUTEROL (PROVENTIL) (5 MG/ML) 0.5% NEBULIZER SOLUTION    Take 1 mL by nebulization every 6 hours as needed for Shortness of Breath    ALBUTEROL SULFATE  (90 BASE) MCG/ACT INHALER    Inhale 2 puffs into the lungs every 6 hours as needed for Wheezing    ALPHA LIPOIC ACID PO    Take 600 mg by mouth 4 times daily    ALVESCO 160 MCG/ACT AERS    INHALE 1 PUFF TWO TIMES ADAY    ASCORBIC ACID (VITAMIN C) 500 MG TABLET    Take 1 tablet by mouth daily    CALCIUM CARB-CHOLECALCIFEROL (CALCIUM-VITAMIN D) 600-400 MG-UNIT TABS    Take 1 tablet by mouth daily    CHOLECALCIFEROL (VITAMIN D3) 5000 UNITS TABS    Take 1 tablet by mouth Daily    GLUCOSAMINE-CHONDROIT-VIT C-MN (GLUCOSAMINE CHONDR 1500 COMPLX) CAPS    Take 1 tablet by mouth daily    HYDROXYZINE (ATARAX) 25 MG

## (undated) DEVICE — BIT DRL L140MM DIA2MM QUIK CPL 3 FLUT CALIB DEPTH MRK W/O

## (undated) DEVICE — ADAPTER CLEANING PORPOISE CLEANING

## (undated) DEVICE — GLOVE SURG SZ 65 THK91MIL LTX FREE SYN POLYISOPRENE

## (undated) DEVICE — SUTURE PDS II SZ 0 L27IN ABSRB VLT L36MM CT-1 1/2 CIR Z340H

## (undated) DEVICE — ELECTRODE PT RET AD L9FT HI MOIST COND ADH HYDRGEL CORDED

## (undated) DEVICE — Device: Brand: DEFENDO VALVE AND CONNECTOR KIT

## (undated) DEVICE — SVMMC ORTH SPL DRP PK

## (undated) DEVICE — INTENDED FOR TISSUE SEPARATION, AND OTHER PROCEDURES THAT REQUIRE A SHARP SURGICAL BLADE TO PUNCTURE OR CUT.: Brand: BARD-PARKER ® CARBON RIB-BACK BLADES

## (undated) DEVICE — SUTURE ETHLN SZ 2-0 L18IN NONABSORBABLE BLK L26MM PS 3/8 585H

## (undated) DEVICE — GLOVE ORANGE PI 8   MSG9080

## (undated) DEVICE — PADDING,UNDERCAST,COTTON, 4"X4YD STERILE: Brand: MEDLINE

## (undated) DEVICE — 1016 S-DRAPE IRRIG POUCH 10/BOX: Brand: STERI-DRAPE™

## (undated) DEVICE — BNDG,ELSTC,MATRIX,STRL,4"X5YD,LF,HOOK&LP: Brand: MEDLINE

## (undated) DEVICE — PERRYSBURG ENDO PACK: Brand: MEDLINE INDUSTRIES, INC.

## (undated) DEVICE — DUAL HOSE W/CPC CONNECTORS: Brand: A.T.S.® TOURNIQUET SYSTEM

## (undated) DEVICE — C-ARMOR C-ARM EQUIPMENT COVERS CLEAR STERILE UNIVERSAL FIT 12 PER CASE: Brand: C-ARMOR

## (undated) DEVICE — TOTAL TRAY, 16FR 10ML SIL FOLEY, URN: Brand: MEDLINE

## (undated) DEVICE — APPLICATOR MEDICATED 26 CC SOLUTION HI LT ORNG CHLORAPREP

## (undated) DEVICE — GOWN,SIRUS,NONRNF,SETINSLV,XL,20/CS: Brand: MEDLINE

## (undated) DEVICE — GLOVE ORANGE PI 8 1/2   MSG9085

## (undated) DEVICE — GLOVE ORANGE PI 7 1/2   MSG9075

## (undated) DEVICE — BANDAGE,GAUZE,BULKEE II,4.5"X4.1YD,STRL: Brand: MEDLINE

## (undated) DEVICE — K WIRE FIX L150MM DIA1.6MM S STL TRCR PNT
Type: IMPLANTABLE DEVICE | Site: ELBOW | Status: NON-FUNCTIONAL
Removed: 2023-01-13

## (undated) DEVICE — 3M™ IOBAN™ 2 ANTIMICROBIAL INCISE DRAPE 6650EZ: Brand: IOBAN™ 2

## (undated) DEVICE — Device

## (undated) DEVICE — TOURNIQUET SURGICAL LG ORANGE HEMACLEAR

## (undated) DEVICE — STOCKINETTE,IMPERVIOUS,12X48,STERILE: Brand: MEDLINE

## (undated) DEVICE — APPLICATOR MEDICATED 10.5 CC SOLUTION HI LT ORNG CHLORAPREP

## (undated) DEVICE — BNDG,ELSTC,MATRIX,STRL,2"X5YD,LF,HOOK&LP: Brand: MEDLINE

## (undated) DEVICE — THE STERILE LIGHT HANDLE COVER IS USED WITH STERIS SURGICAL LIGHTING AND VISUALIZATION SYSTEMS.

## (undated) DEVICE — SCREW BNE L40MM DIA2.7MM MTPHSEAL EL S STL ST VAR ANG LOK: Type: IMPLANTABLE DEVICE | Site: ELBOW | Status: NON-FUNCTIONAL

## (undated) DEVICE — TOURNIQUET SURGICAL MED YELLOW HEMACLEAR

## (undated) DEVICE — TUBING SUCT 12FR MAL ALUM SHFT FN CAP VENT UNIV CONN W/ OBT

## (undated) DEVICE — GARMENT,MEDLINE,DVT,INT,CALF,MED, GEN2: Brand: MEDLINE

## (undated) DEVICE — DRAPE,U/ SHT,SPLIT,PLAS,STERIL: Brand: MEDLINE

## (undated) DEVICE — SUTURE FIBERWIRE SZ 5 L38IN NONABSORBABLE BLU L48MM 1/2 AR7211

## (undated) DEVICE — GLOVE ORANGE PI 7   MSG9070

## (undated) DEVICE — SURGICAL SUCTION CONNECTING TUBE WITH MALE CONNECTOR AND SUCTION CLAMP, 2 FT. LONG (.6 M), 5 MM I.D.: Brand: CONMED

## (undated) DEVICE — PADDING,UNDERCAST,COTTON, 3X4YD STERILE: Brand: MEDLINE

## (undated) DEVICE — SUTURE MCRYL SZ 2-0 L27IN ABSRB UD SH L26MM TAPERPOINT NDL Y417H

## (undated) DEVICE — CYSTO/BLADDER IRRIGATION SET, REGULATING CLAMP